# Patient Record
Sex: MALE | Race: WHITE | NOT HISPANIC OR LATINO | Employment: FULL TIME | URBAN - METROPOLITAN AREA
[De-identification: names, ages, dates, MRNs, and addresses within clinical notes are randomized per-mention and may not be internally consistent; named-entity substitution may affect disease eponyms.]

---

## 2022-09-23 ENCOUNTER — PREP FOR SURGERY (OUTPATIENT)
Dept: OTHER | Facility: HOSPITAL | Age: 52
End: 2022-09-23

## 2022-09-23 ENCOUNTER — OFFICE VISIT (OUTPATIENT)
Dept: SURGERY | Facility: CLINIC | Age: 52
End: 2022-09-23

## 2022-09-23 VITALS — HEIGHT: 72 IN | RESPIRATION RATE: 16 BRPM | BODY MASS INDEX: 40.66 KG/M2 | WEIGHT: 300.2 LBS

## 2022-09-23 DIAGNOSIS — L72.3 SEBACEOUS CYST: Primary | ICD-10-CM

## 2022-09-23 PROBLEM — E66.01 MORBID OBESITY: Status: ACTIVE | Noted: 2022-09-23

## 2022-09-23 PROCEDURE — 99203 OFFICE O/P NEW LOW 30 MIN: CPT | Performed by: SURGERY

## 2022-09-23 RX ORDER — SODIUM CHLORIDE, SODIUM LACTATE, POTASSIUM CHLORIDE, CALCIUM CHLORIDE 600; 310; 30; 20 MG/100ML; MG/100ML; MG/100ML; MG/100ML
70 INJECTION, SOLUTION INTRAVENOUS CONTINUOUS
Status: CANCELLED | OUTPATIENT
Start: 2022-09-23

## 2022-09-23 RX ORDER — ONDANSETRON 2 MG/ML
4 INJECTION INTRAMUSCULAR; INTRAVENOUS EVERY 6 HOURS PRN
Status: CANCELLED | OUTPATIENT
Start: 2022-09-23

## 2022-09-23 NOTE — PROGRESS NOTES
"Inpatient History and Physical Surgical Orders    Preadmission Location:   Preadmission Time:  Facility:  Surgery Date:  Surgery Time:  Preadmission Test date:     Chief Complaint  Outpatient History and Physical / Surgical Orders    Primary Care Provider: Provider, No Known    Referring Provider: Brenton Bernard PA    Subjective      Patient Name: Babak Manzano : 1970    HPI  The patient is a 52-year-old gentleman that presents with a sizable cyst on his right posterior shoulder as well as a small sebaceous cyst of the forehead.  The cyst on his right posterior shoulder is a little bit uncomfortable.    Past History:  Medical History: has no past medical history on file.   Surgical History: has a past surgical history that includes Hernia repair; Back surgery; Fracture surgery; and cervical spine posterior.   Family History: family history is not on file.   Social History: reports that he has been smoking cigarettes. He has been smoking about 0.50 packs per day. He has never used smokeless tobacco. He reports current alcohol use.  Allergies: Dilaudid [hydromorphone], Norco [hydrocodone-acetaminophen], Penicillins, Percocet [oxycodone-acetaminophen], and Tylenol [acetaminophen]     No current outpatient medications on file.       Objective   Vital Signs:   Resp 16   Ht 182.9 cm (72\")   Wt (!) 136 kg (300 lb 3.2 oz)   BMI 40.71 kg/m²       Physical Exam  Vitals and nursing note reviewed.   Constitutional:       Appearance: Normal appearance. The patient is well-developed.   Cardiovascular:      Rate and Rhythm: Normal rate and regular rhythm.   Pulmonary:      Effort: Pulmonary effort is normal.      Breath sounds: Normal air entry.   Abdominal:      General: Bowel sounds are normal.      Palpations: Abdomen is soft.      Skin:     General: Skin is warm and dry.   Neurological:      Mental Status: The patient is alert and oriented to person, place, and time.      Motor: Motor function is intact. "   Psychiatric:         Mood and Affect: Mood normal.   HEENT: He has a 1 cm sebaceous cyst in the middle of his forehead.  Back: 4 cm posterior right shoulder cyst noted  Result Review :               Assessment and Plan   Diagnoses and all orders for this visit:    1. Sebaceous cyst (Primary)    We will schedule him for an excision of the sebaceous cysts.  I have described the procedure to him as well as the risk and benefits and he is agreeable to proceeding.    I  Ad Cobos MD  09/23/2022

## 2022-10-10 ENCOUNTER — TELEPHONE (OUTPATIENT)
Dept: UROLOGY | Facility: CLINIC | Age: 52
End: 2022-10-10

## 2022-10-10 NOTE — TELEPHONE ENCOUNTER
Patient's wife called upset. She stated she needs to cancel her 's surgery.    She states he is currently in emergency surgery in [place] because he is septic from the sebaceous cyst that he was scheduled here  for removal.

## 2022-10-17 ENCOUNTER — OFFICE VISIT (OUTPATIENT)
Dept: FAMILY MEDICINE CLINIC | Facility: CLINIC | Age: 52
End: 2022-10-17

## 2022-10-17 VITALS
OXYGEN SATURATION: 98 % | WEIGHT: 292 LBS | TEMPERATURE: 96.6 F | DIASTOLIC BLOOD PRESSURE: 84 MMHG | SYSTOLIC BLOOD PRESSURE: 128 MMHG | BODY MASS INDEX: 39.6 KG/M2 | HEART RATE: 96 BPM

## 2022-10-17 DIAGNOSIS — Z48.01 ENCOUNTER FOR SURGICAL WOUND DRESSING CHANGE: Primary | ICD-10-CM

## 2022-10-17 DIAGNOSIS — D69.6 THROMBOCYTOPENIA: ICD-10-CM

## 2022-10-17 DIAGNOSIS — Z51.89 VISIT FOR WOUND CHECK: ICD-10-CM

## 2022-10-17 PROBLEM — L03.113 CELLULITIS OF RIGHT SHOULDER: Status: ACTIVE | Noted: 2022-10-10

## 2022-10-17 PROBLEM — Z98.890 STATUS POST INCISION AND DRAINAGE: Status: ACTIVE | Noted: 2022-10-11

## 2022-10-17 PROBLEM — A41.9 SEPSIS: Status: ACTIVE | Noted: 2022-10-10

## 2022-10-17 LAB
BASOPHILS # BLD MANUAL: 0.1 10*3/MM3 (ref 0–0.2)
BASOPHILS NFR BLD MANUAL: 1 % (ref 0–1.5)
DEPRECATED RDW RBC AUTO: 44.8 FL (ref 37–54)
EOSINOPHIL # BLD MANUAL: 0.1 10*3/MM3 (ref 0–0.4)
EOSINOPHIL NFR BLD MANUAL: 1 % (ref 0.3–6.2)
ERYTHROCYTE [DISTWIDTH] IN BLOOD BY AUTOMATED COUNT: 13.6 % (ref 12.3–15.4)
HCT VFR BLD AUTO: 44.7 % (ref 37.5–51)
HGB BLD-MCNC: 15.1 G/DL (ref 13–17.7)
LYMPHOCYTES # BLD MANUAL: 3.28 10*3/MM3 (ref 0.7–3.1)
LYMPHOCYTES NFR BLD MANUAL: 11 % (ref 5–12)
MCH RBC QN AUTO: 30.4 PG (ref 26.6–33)
MCHC RBC AUTO-ENTMCNC: 33.8 G/DL (ref 31.5–35.7)
MCV RBC AUTO: 89.9 FL (ref 79–97)
MONOCYTES # BLD: 1.06 10*3/MM3 (ref 0.1–0.9)
NEUTROPHILS # BLD AUTO: 5.11 10*3/MM3 (ref 1.7–7)
NEUTROPHILS NFR BLD MANUAL: 53 % (ref 42.7–76)
NRBC BLD AUTO-RTO: 0 /100 WBC (ref 0–0.2)
PLAT MORPH BLD: NORMAL
PLATELET # BLD AUTO: 226 10*3/MM3 (ref 140–450)
PMV BLD AUTO: 11.5 FL (ref 6–12)
RBC # BLD AUTO: 4.97 10*6/MM3 (ref 4.14–5.8)
RBC MORPH BLD: NORMAL
VARIANT LYMPHS NFR BLD MANUAL: 34 % (ref 19.6–45.3)
WBC MORPH BLD: NORMAL
WBC NRBC COR # BLD: 9.64 10*3/MM3 (ref 3.4–10.8)

## 2022-10-17 PROCEDURE — 85025 COMPLETE CBC W/AUTO DIFF WBC: CPT | Performed by: NURSE PRACTITIONER

## 2022-10-17 PROCEDURE — 85007 BL SMEAR W/DIFF WBC COUNT: CPT | Performed by: NURSE PRACTITIONER

## 2022-10-17 PROCEDURE — 99203 OFFICE O/P NEW LOW 30 MIN: CPT | Performed by: NURSE PRACTITIONER

## 2022-10-17 RX ORDER — IBUPROFEN 200 MG
200 TABLET ORAL EVERY 6 HOURS PRN
COMMUNITY

## 2022-10-17 RX ORDER — DOXYCYCLINE HYCLATE 100 MG/1
100 CAPSULE ORAL 2 TIMES DAILY
Qty: 56 CAPSULE | Refills: 0 | COMMUNITY
Start: 2022-10-13 | End: 2022-11-10

## 2022-10-17 NOTE — PROGRESS NOTES
Chief Complaint  Establish Care (Wound care follow up, lab draw for platelet count)      Subjective        History reviewed. No pertinent past medical history.  Social History     Tobacco Use   • Smoking status: Every Day     Packs/day: 0.50     Types: Cigarettes   • Smokeless tobacco: Never   Vaping Use   • Vaping Use: Some days   Substance Use Topics   • Alcohol use: Yes     Comment: social      Current Outpatient Medications on File Prior to Visit   Medication Sig   • doxycycline (VIBRAMYCIN) 100 MG capsule Take 1 capsule by mouth 2 (Two) Times a Day.   • ibuprofen (ADVIL,MOTRIN) 200 MG tablet Take 1 tablet by mouth Every 6 (Six) Hours As Needed.     No current facility-administered medications on file prior to visit.      Allergies   Allergen Reactions   • Dilaudid [Hydromorphone] Hives   • Norco [Hydrocodone-Acetaminophen] Hives   • Penicillins Hives   • Percocet [Oxycodone-Acetaminophen] Hives   • Tylenol [Acetaminophen] Hives      Health Maintenance Due   Topic Date Due   • COLORECTAL CANCER SCREENING  Never done   • COVID-19 Vaccine (1) Never done   • ANNUAL PHYSICAL  Never done   • Pneumococcal Vaccine 0-64 (1 - PCV) Never done   • ZOSTER VACCINE (1 of 2) Never done   • INFLUENZA VACCINE  Never done   • HEPATITIS C SCREENING  Never done      Babak Manzano presents to [facility]  History of Present Illness  Here to follow up on wound care, he had a sebaceous cyst and ruptured on the inside and ended up with sepsis and was admitted to hospital. States he had emergency surgery and had multiple organisms present. He was on vancomycin in the hospital and ancef.     Pts wife has been cleansing wound and repacking it once daily.     Pt moved to the area from [place]; plans to move out of [facility] the [facility] again soon.       Objective   Vital Signs:   /84   Pulse 96   Temp 96.6 °F (35.9 °C)   Wt 132 kg (292 lb)   SpO2 98%   BMI 39.60 kg/m²     Review of Systems   Physical  Exam  Vitals reviewed.   Constitutional:       General: He is not in acute distress.     Appearance: Normal appearance. He is well-developed.   HENT:      Head: Normocephalic and atraumatic.   Eyes:      Conjunctiva/sclera: Conjunctivae normal.      Pupils: Pupils are equal, round, and reactive to light.   Cardiovascular:      Rate and Rhythm: Normal rate and regular rhythm.      Heart sounds: Normal heart sounds.   Pulmonary:      Effort: Pulmonary effort is normal.      Breath sounds: Normal breath sounds.   Skin:     General: Skin is warm and dry.             Comments: Beefy red    Neurological:      Mental Status: He is alert and oriented to person, place, and time.   Psychiatric:         Mood and Affect: Mood and affect normal.         Behavior: Behavior normal.         Thought Content: Thought content normal.         Judgment: Judgment normal.        Result Review :   The following data was reviewed by: SANTHOSH Aj on 10/17/2022:    CBC from [facility].  Data reviewed: Recent hospitalization notes Admission note from [facility].           Assessment and Plan    Diagnoses and all orders for this visit:    1. Encounter for surgical wound dressing change (Primary)    2. Thrombocytopenia (HCC)  -     CBC & Differential    3. Visit for wound check           Dressing removed from inside wound, irrigated with partial peroxide and normal saline then rinse with plain normal saline.  Rolled gauze used to pack wound and 4 x 4 gauze placed over top and taped to skin.  Patient tolerated well.  Wound healing appropriately.  Patient instructed to follow-up in 1 week to recheck wound and continue current daily dressing changes at home.  Continue to take doxycycline.    Follow Up   Return in about 1 week (around 10/24/2022) for Recheck.  Patient was given instructions and counseling regarding his condition or for health maintenance advice. Please see specific information pulled into the AVS if  appropriate.

## 2022-10-17 NOTE — TELEPHONE ENCOUNTER
PER OSBALDO, PATIENT'S WIFE CAME INTO THE OFFICE TODAY, AND SAID SHE NEEDS THE SURGERY ON 10/20/22 CANCELLED.  SHE CAME IN AGGRAVATED THAT IT WAS STILL SCHEDULED.  SAID WE MANIPULATED SOMETHING AND HE HAD TO HAVE EMERGENCY SURGERY.

## 2022-10-19 ENCOUNTER — TELEPHONE (OUTPATIENT)
Dept: FAMILY MEDICINE CLINIC | Facility: CLINIC | Age: 52
End: 2022-10-19

## 2022-10-19 NOTE — TELEPHONE ENCOUNTER
Caller: EDI DOTSON    Relationship: Emergency Contact    Best call back number: [phone]    Who is your current provider: PETEY LONGO     Who would you like your new provider to be:  MARCI BETANCOURT     What are your reasons for transferring care: WOULD LIKE TO SEE A DIFFERENT PROVIDER

## 2022-10-21 ENCOUNTER — PATIENT ROUNDING (BHMG ONLY) (OUTPATIENT)
Dept: FAMILY MEDICINE CLINIC | Facility: CLINIC | Age: 52
End: 2022-10-21

## 2022-10-21 NOTE — PROGRESS NOTES
October 21, 2022    Hello, may I speak with Babak Manzano?    My name is Queta      I am  with [facility]  [address] [place] [address] [address] [place] [address] [phone].    Before we get started may I verify your date of birth? 1970    I am calling to officially welcome you to our practice and ask about your recent visit. Is this a good time to talk? yes    Tell me about your visit with us. What things went well?  went good       We're always looking for ways to make our patients' experiences even better. Do you have recommendations on ways we may improve?  no    Overall were you satisfied with your first visit to our practice? yes       I appreciate you taking the time to speak with me today. Is there anything else I can do for you? no      Thank you, and have a great day.

## 2022-10-24 ENCOUNTER — OFFICE VISIT (OUTPATIENT)
Dept: FAMILY MEDICINE CLINIC | Facility: CLINIC | Age: 52
End: 2022-10-24

## 2022-10-24 VITALS
BODY MASS INDEX: 38.47 KG/M2 | TEMPERATURE: 97.5 F | HEIGHT: 72 IN | SYSTOLIC BLOOD PRESSURE: 130 MMHG | DIASTOLIC BLOOD PRESSURE: 80 MMHG | HEART RATE: 93 BPM | WEIGHT: 284 LBS | OXYGEN SATURATION: 97 %

## 2022-10-24 DIAGNOSIS — Z48.01 ENCOUNTER FOR SURGICAL WOUND DRESSING CHANGE: Primary | ICD-10-CM

## 2022-10-24 DIAGNOSIS — Z51.89 VISIT FOR WOUND CHECK: ICD-10-CM

## 2022-10-24 PROCEDURE — 99212 OFFICE O/P EST SF 10 MIN: CPT | Performed by: NURSE PRACTITIONER

## 2022-10-24 RX ORDER — VIT C/B6/B5/MAGNESIUM/HERB 173 50-5-6-5MG
1 CAPSULE ORAL DAILY
COMMUNITY
End: 2022-11-02

## 2022-10-24 NOTE — PROGRESS NOTES
"Chief Complaint  Wound Check (Wound repacking)    Subjective        History reviewed. No pertinent past medical history.  Social History     Tobacco Use   • Smoking status: Every Day     Packs/day: 0.50     Types: Cigarettes   • Smokeless tobacco: Never   Vaping Use   • Vaping Use: Some days   Substance Use Topics   • Alcohol use: Yes     Comment: social      Current Outpatient Medications on File Prior to Visit   Medication Sig   • doxycycline (VIBRAMYCIN) 100 MG capsule Take 1 capsule by mouth 2 (Two) Times a Day.   • ibuprofen (ADVIL,MOTRIN) 200 MG tablet Take 1 tablet by mouth Every 6 (Six) Hours As Needed.   • Turmeric 500 MG capsule Take 1 capsule by mouth Daily.     No current facility-administered medications on file prior to visit.      Allergies   Allergen Reactions   • Azithromycin Hives   • Dilaudid [Hydromorphone] Hives   • Norco [Hydrocodone-Acetaminophen] Hives   • Penicillins Hives   • Percocet [Oxycodone-Acetaminophen] Hives   • Sulfa Antibiotics Hives and Swelling   • Tylenol [Acetaminophen] Hives      Health Maintenance Due   Topic Date Due   • COLORECTAL CANCER SCREENING  Never done   • COVID-19 Vaccine (1) Never done   • ANNUAL PHYSICAL  Never done   • Pneumococcal Vaccine 0-64 (1 - PCV) Never done   • ZOSTER VACCINE (1 of 2) Never done   • INFLUENZA VACCINE  Never done   • HEPATITIS C SCREENING  Never done      Babak Manzano presents to [facility]  History of Present Illness  Here to follow up on abscess to the right posterior shoulder. Pts wife is changing dressing daily.     Pt plans to follow up with Dr. Kessler to discuss any additional issues and have Colon cancer screening, etc, ordered.     Pt will remain off until area is healed due to inability to repack wound while on the road for weeks at a time as a .       Objective   Vital Signs:   /80 (BP Location: Left arm)   Pulse 93   Temp 97.5 °F (36.4 °C)   Ht 182.9 cm (72\")   Wt 129 kg " (284 lb)   SpO2 97%   BMI 38.52 kg/m²     Review of Systems   Physical Exam  Vitals reviewed.   Constitutional:       General: He is not in acute distress.     Appearance: Normal appearance. He is well-developed.   Cardiovascular:      Rate and Rhythm: Normal rate and regular rhythm.      Heart sounds: Normal heart sounds.   Pulmonary:      Effort: Pulmonary effort is normal.      Breath sounds: Normal breath sounds.   Skin:     General: Skin is warm and dry.             Comments: Old dressing removed with mildly yellowish drainage noted on gauze. Wound irrigated with Normal saline and then packed with kerlex moist gauze then covered with 4x4 dressing and paper tape used to secure.    Neurological:      Mental Status: He is alert and oriented to person, place, and time.   Psychiatric:         Mood and Affect: Mood and affect normal.         Behavior: Behavior normal.         Thought Content: Thought content normal.         Judgment: Judgment normal.        Result Review :                 Assessment and Plan    Diagnoses and all orders for this visit:    1. Encounter for surgical wound dressing change (Primary)    2. Visit for wound check        Follow Up   Return in about 1 week (around 10/31/2022) for Recheck.  Patient was given instructions and counseling regarding his condition or for health maintenance advice. Please see specific information pulled into the AVS if appropriate.

## 2022-10-31 ENCOUNTER — OFFICE VISIT (OUTPATIENT)
Dept: FAMILY MEDICINE CLINIC | Facility: CLINIC | Age: 52
End: 2022-10-31

## 2022-10-31 VITALS
TEMPERATURE: 96.9 F | HEART RATE: 103 BPM | SYSTOLIC BLOOD PRESSURE: 130 MMHG | DIASTOLIC BLOOD PRESSURE: 88 MMHG | BODY MASS INDEX: 38.19 KG/M2 | HEIGHT: 72 IN | OXYGEN SATURATION: 97 % | WEIGHT: 282 LBS

## 2022-10-31 DIAGNOSIS — Z48.01 ENCOUNTER FOR SURGICAL WOUND DRESSING CHANGE: Primary | ICD-10-CM

## 2022-10-31 DIAGNOSIS — Z51.89 VISIT FOR WOUND CHECK: ICD-10-CM

## 2022-10-31 PROCEDURE — 99212 OFFICE O/P EST SF 10 MIN: CPT | Performed by: NURSE PRACTITIONER

## 2022-10-31 NOTE — PROGRESS NOTES
"Chief Complaint  Wound Check (Repacking )    Subjective        History reviewed. No pertinent past medical history.  Social History     Tobacco Use   • Smoking status: Every Day     Packs/day: 0.50     Types: Cigarettes   • Smokeless tobacco: Never   Vaping Use   • Vaping Use: Some days   Substance Use Topics   • Alcohol use: Yes     Comment: social      Current Outpatient Medications on File Prior to Visit   Medication Sig   • doxycycline (VIBRAMYCIN) 100 MG capsule Take 1 capsule by mouth 2 (Two) Times a Day.   • ibuprofen (ADVIL,MOTRIN) 200 MG tablet Take 1 tablet by mouth Every 6 (Six) Hours As Needed.   • Turmeric 500 MG capsule Take 1 capsule by mouth Daily.     No current facility-administered medications on file prior to visit.      Allergies   Allergen Reactions   • Azithromycin Hives   • Dilaudid [Hydromorphone] Hives   • Norco [Hydrocodone-Acetaminophen] Hives   • Penicillins Hives   • Percocet [Oxycodone-Acetaminophen] Hives   • Sulfa Antibiotics Hives and Swelling   • Tylenol [Acetaminophen] Hives      Health Maintenance Due   Topic Date Due   • COLORECTAL CANCER SCREENING  Never done   • COVID-19 Vaccine (1) Never done   • ANNUAL PHYSICAL  Never done   • Pneumococcal Vaccine 0-64 (1 - PCV) Never done   • ZOSTER VACCINE (1 of 2) Never done   • INFLUENZA VACCINE  Never done   • HEPATITIS C SCREENING  Never done      Babak Maznano presents to [facility]  History of Present Illness  Here to follow-up on right upper back abscess.  Patient states that the area is itching, his wife is repacking daily.  Patient has 9 days of antibiotics left plans to complete full course.      Objective   Vital Signs:   /88 (BP Location: Left arm)   Pulse 103   Temp 96.9 °F (36.1 °C)   Ht 182.9 cm (72\")   Wt 128 kg (282 lb)   SpO2 97%   BMI 38.25 kg/m²     Review of Systems   Physical Exam  Vitals reviewed.   Constitutional:       General: He is not in acute distress.     " Appearance: Normal appearance. He is well-developed.   Skin:     General: Skin is warm and dry.          Neurological:      Mental Status: He is alert and oriented to person, place, and time.   Psychiatric:         Mood and Affect: Mood and affect normal.         Behavior: Behavior normal.         Thought Content: Thought content normal.         Judgment: Judgment normal.        Result Review :                 Assessment and Plan    Diagnoses and all orders for this visit:    1. Encounter for surgical wound dressing change (Primary)    2. Visit for wound check        Follow Up   Return if symptoms worsen or fail to improve.  Patient was given instructions and counseling regarding his condition or for health maintenance advice. Please see specific information pulled into the AVS if appropriate.

## 2022-11-02 ENCOUNTER — OFFICE VISIT (OUTPATIENT)
Dept: FAMILY MEDICINE CLINIC | Facility: CLINIC | Age: 52
End: 2022-11-02

## 2022-11-02 VITALS
HEIGHT: 72 IN | DIASTOLIC BLOOD PRESSURE: 72 MMHG | WEIGHT: 279.2 LBS | HEART RATE: 112 BPM | OXYGEN SATURATION: 98 % | TEMPERATURE: 96.8 F | BODY MASS INDEX: 37.82 KG/M2 | SYSTOLIC BLOOD PRESSURE: 146 MMHG

## 2022-11-02 DIAGNOSIS — Z23 NEED FOR INFLUENZA VACCINATION: ICD-10-CM

## 2022-11-02 DIAGNOSIS — F17.200 CURRENT SMOKER: ICD-10-CM

## 2022-11-02 DIAGNOSIS — B35.1 ONYCHOMYCOSIS: ICD-10-CM

## 2022-11-02 DIAGNOSIS — Z13.29 SCREENING FOR THYROID DISORDER: ICD-10-CM

## 2022-11-02 DIAGNOSIS — D23.30 DERMOID CYST OF FACE: ICD-10-CM

## 2022-11-02 DIAGNOSIS — Z76.89 ENCOUNTER TO ESTABLISH CARE: Primary | ICD-10-CM

## 2022-11-02 DIAGNOSIS — Z13.220 SCREENING FOR CHOLESTEROL LEVEL: ICD-10-CM

## 2022-11-02 DIAGNOSIS — E66.1 CLASS 2 DRUG-INDUCED OBESITY WITHOUT SERIOUS COMORBIDITY WITH BODY MASS INDEX (BMI) OF 37.0 TO 37.9 IN ADULT: ICD-10-CM

## 2022-11-02 DIAGNOSIS — Z11.59 ENCOUNTER FOR HEPATITIS C SCREENING TEST FOR LOW RISK PATIENT: ICD-10-CM

## 2022-11-02 DIAGNOSIS — Z12.11 SCREENING FOR COLON CANCER: ICD-10-CM

## 2022-11-02 DIAGNOSIS — Z13.1 SCREENING FOR DIABETES MELLITUS: ICD-10-CM

## 2022-11-02 DIAGNOSIS — I10 STAGE 2 HYPERTENSION: ICD-10-CM

## 2022-11-02 PROBLEM — E66.812 CLASS 2 OBESITY IN ADULT: Status: ACTIVE | Noted: 2022-09-23

## 2022-11-02 PROBLEM — M54.50 CHRONIC MIDLINE LOW BACK PAIN WITHOUT SCIATICA: Status: ACTIVE | Noted: 2022-11-02

## 2022-11-02 PROBLEM — E66.9 CLASS 2 OBESITY IN ADULT: Status: ACTIVE | Noted: 2022-09-23

## 2022-11-02 PROBLEM — Z86.19 HISTORY OF SEPSIS: Status: ACTIVE | Noted: 2022-11-02

## 2022-11-02 PROBLEM — G89.29 CHRONIC MIDLINE LOW BACK PAIN WITHOUT SCIATICA: Status: ACTIVE | Noted: 2022-11-02

## 2022-11-02 LAB
BASOPHILS # BLD AUTO: 0.03 10*3/MM3 (ref 0–0.2)
BASOPHILS NFR BLD AUTO: 0.5 % (ref 0–1.5)
DEPRECATED RDW RBC AUTO: 47.3 FL (ref 37–54)
EOSINOPHIL # BLD AUTO: 0.15 10*3/MM3 (ref 0–0.4)
EOSINOPHIL NFR BLD AUTO: 2.4 % (ref 0.3–6.2)
ERYTHROCYTE [DISTWIDTH] IN BLOOD BY AUTOMATED COUNT: 13.9 % (ref 12.3–15.4)
HBA1C MFR BLD: 6.2 % (ref 4.8–5.6)
HCT VFR BLD AUTO: 45.8 % (ref 37.5–51)
HGB BLD-MCNC: 15.1 G/DL (ref 13–17.7)
LYMPHOCYTES # BLD AUTO: 2.47 10*3/MM3 (ref 0.7–3.1)
LYMPHOCYTES NFR BLD AUTO: 39.7 % (ref 19.6–45.3)
MCH RBC QN AUTO: 30.5 PG (ref 26.6–33)
MCHC RBC AUTO-ENTMCNC: 33 G/DL (ref 31.5–35.7)
MCV RBC AUTO: 92.5 FL (ref 79–97)
MONOCYTES # BLD AUTO: 0.65 10*3/MM3 (ref 0.1–0.9)
MONOCYTES NFR BLD AUTO: 10.5 % (ref 5–12)
NEUTROPHILS NFR BLD AUTO: 2.91 10*3/MM3 (ref 1.7–7)
NEUTROPHILS NFR BLD AUTO: 46.7 % (ref 42.7–76)
PLATELET # BLD AUTO: 121 10*3/MM3 (ref 140–450)
PMV BLD AUTO: 14.3 FL (ref 6–12)
RBC # BLD AUTO: 4.95 10*6/MM3 (ref 4.14–5.8)
WBC NRBC COR # BLD: 6.22 10*3/MM3 (ref 3.4–10.8)

## 2022-11-02 PROCEDURE — 84443 ASSAY THYROID STIM HORMONE: CPT | Performed by: FAMILY MEDICINE

## 2022-11-02 PROCEDURE — 85025 COMPLETE CBC W/AUTO DIFF WBC: CPT | Performed by: FAMILY MEDICINE

## 2022-11-02 PROCEDURE — 86803 HEPATITIS C AB TEST: CPT | Performed by: FAMILY MEDICINE

## 2022-11-02 PROCEDURE — 80061 LIPID PANEL: CPT | Performed by: FAMILY MEDICINE

## 2022-11-02 PROCEDURE — 83036 HEMOGLOBIN GLYCOSYLATED A1C: CPT | Performed by: FAMILY MEDICINE

## 2022-11-02 PROCEDURE — 80053 COMPREHEN METABOLIC PANEL: CPT | Performed by: FAMILY MEDICINE

## 2022-11-02 PROCEDURE — 99213 OFFICE O/P EST LOW 20 MIN: CPT | Performed by: FAMILY MEDICINE

## 2022-11-02 NOTE — PROGRESS NOTES
"Chief Complaint    Establish Care (Exam to establish care)    Subjective      Babak Manzano presents to [facility]    History of Present Illness    1.) ESTABLISH CARE : Recent hospitalization secondary to sepsis - infected cyst.  Patient is now completing a course of doxycycline.  He presents with a wound of his right upper back - well healing - undergoing packing per his spouse.  History of chronic back pain - patient reports stable symptoms.  He presents today with complaint of intermittent chest discomfort - located proximal to his xiphoid process.  History of smoking - since age of 9 - pt admits that he has smoked up to 2 PPD. He currently smokes 1/2 PPD.     Objective     Vital Signs:     /72 (BP Location: Left arm, Patient Position: Sitting, Cuff Size: Adult)   Pulse 112   Temp 96.8 °F (36 °C) (Temporal)   Ht 182.9 cm (72\")   Wt 127 kg (279 lb 3.2 oz)   SpO2 98%   BMI 37.87 kg/m²       Physical Exam  Vitals reviewed.   Constitutional:       General: He is not in acute distress.     Appearance: Normal appearance. He is well-developed.   HENT:      Head: Normocephalic and atraumatic.      Right Ear: Hearing and external ear normal.      Left Ear: Hearing and external ear normal.      Nose: Nose normal.      Mouth/Throat:      Lips: No lesions.      Mouth: No oral lesions.      Dentition: No gum lesions.      Tongue: No lesions.      Palate: No lesions.      Pharynx: No oropharyngeal exudate or posterior oropharyngeal erythema.   Eyes:      General: Lids are normal.         Right eye: No discharge.         Left eye: No discharge.      Conjunctiva/sclera: Conjunctivae normal.   Cardiovascular:      Rate and Rhythm: Normal rate and regular rhythm.   Pulmonary:      Effort: Pulmonary effort is normal.      Breath sounds: Normal breath sounds.   Abdominal:      General: There is no distension.   Musculoskeletal:         General: No swelling.      Cervical back: Neck " supple.      Comments: (+) thickened + discolored nails noted of some nail beds of finger   Skin:     Coloration: Skin is not jaundiced.      Findings: No erythema.   Neurological:      Mental Status: He is alert. Mental status is at baseline.   Psychiatric:         Mood and Affect: Mood and affect normal.         Thought Content: Thought content normal.       Assessment and Plan     Diagnoses and all orders for this visit:    1. Encounter to establish care (Primary)  Comments:  See below.     2. Stage 2 hypertension  Comments:  Pt reports 'numbers at home on the brink of HTN' - will continue to monitor.   Orders:  -     CBC & Differential  -     Comprehensive Metabolic Panel    3. Screening for cholesterol level  -     Lipid Panel    4. Encounter for hepatitis C screening test for low risk patient  -     Hepatitis C Antibody    5. Screening for diabetes mellitus  -     Hemoglobin A1c    6. Class 2 drug-induced obesity without serious comorbidity with body mass index (BMI) of 37.0 to 37.9 in adult  -     Hemoglobin A1c    7. Screening for thyroid disorder  -     TSH    8. Need for influenza vaccination  Comments:  Declined per patient.    9. Screening for colon cancer  -     Cancel: Ambulatory Referral to Gastroenterology  -     Ambulatory Referral to General Surgery    10. Current smoker  -     CT Chest Low Dose Wo; Future    11. Onychomycosis  -     Ambulatory Referral to Podiatry    12. Dermoid cyst of face  -     Ambulatory Referral to Dermatology      Follow Up     Return for aforementioned issues TBD per review of labs.    Patient was given instructions and counseling regarding his condition or for health maintenance advice. Please see specific information pulled into the AVS if appropriate.

## 2022-11-02 NOTE — PROGRESS NOTES
Venipuncture Blood Specimen Collection  Venipuncture performed in LEFT AC By Brissa Heart MA with good hemostasis. Patient tolerated the procedure well without complications.   11/02/22   Brissa Heart MA

## 2022-11-03 LAB
ALBUMIN SERPL-MCNC: 4.6 G/DL (ref 3.5–5.2)
ALBUMIN/GLOB SERPL: 1.8 G/DL
ALP SERPL-CCNC: 61 U/L (ref 39–117)
ALT SERPL W P-5'-P-CCNC: 35 U/L (ref 1–41)
ANION GAP SERPL CALCULATED.3IONS-SCNC: 10.7 MMOL/L (ref 5–15)
AST SERPL-CCNC: 27 U/L (ref 1–40)
BILIRUB SERPL-MCNC: 0.5 MG/DL (ref 0–1.2)
BUN SERPL-MCNC: 19 MG/DL (ref 6–20)
BUN/CREAT SERPL: 20.4 (ref 7–25)
CALCIUM SPEC-SCNC: 10.1 MG/DL (ref 8.6–10.5)
CHLORIDE SERPL-SCNC: 105 MMOL/L (ref 98–107)
CHOLEST SERPL-MCNC: 179 MG/DL (ref 0–200)
CO2 SERPL-SCNC: 23.3 MMOL/L (ref 22–29)
CREAT SERPL-MCNC: 0.93 MG/DL (ref 0.76–1.27)
EGFRCR SERPLBLD CKD-EPI 2021: 99.4 ML/MIN/1.73
GLOBULIN UR ELPH-MCNC: 2.5 GM/DL
GLUCOSE SERPL-MCNC: 103 MG/DL (ref 65–99)
HCV AB SER DONR QL: NORMAL
HDLC SERPL-MCNC: 49 MG/DL (ref 40–60)
LDLC SERPL CALC-MCNC: 104 MG/DL (ref 0–100)
LDLC/HDLC SERPL: 2.04 {RATIO}
POTASSIUM SERPL-SCNC: 4 MMOL/L (ref 3.5–5.2)
PROT SERPL-MCNC: 7.1 G/DL (ref 6–8.5)
SODIUM SERPL-SCNC: 139 MMOL/L (ref 136–145)
TRIGL SERPL-MCNC: 149 MG/DL (ref 0–150)
TSH SERPL DL<=0.05 MIU/L-ACNC: 3.75 UIU/ML (ref 0.27–4.2)
VLDLC SERPL-MCNC: 26 MG/DL (ref 5–40)

## 2022-11-08 ENCOUNTER — HOSPITAL ENCOUNTER (OUTPATIENT)
Dept: CT IMAGING | Facility: HOSPITAL | Age: 52
Discharge: HOME | End: 2022-11-08
Admitting: FAMILY MEDICINE

## 2022-11-08 DIAGNOSIS — F17.200 CURRENT SMOKER: ICD-10-CM

## 2022-11-08 PROCEDURE — 71271 CT THORAX LUNG CANCER SCR C-: CPT

## 2022-11-15 ENCOUNTER — OFFICE VISIT (OUTPATIENT)
Dept: SURGERY | Facility: CLINIC | Age: 52
End: 2022-11-15

## 2022-11-15 ENCOUNTER — PREP FOR SURGERY (OUTPATIENT)
Dept: OTHER | Facility: HOSPITAL | Age: 52
End: 2022-11-15

## 2022-11-15 VITALS — BODY MASS INDEX: 39.6 KG/M2 | WEIGHT: 292 LBS

## 2022-11-15 DIAGNOSIS — Z12.12 SCREENING FOR COLORECTAL CANCER: Primary | ICD-10-CM

## 2022-11-15 DIAGNOSIS — Z12.11 SCREENING FOR COLORECTAL CANCER: Primary | ICD-10-CM

## 2022-11-15 PROCEDURE — S0260 H&P FOR SURGERY: HCPCS | Performed by: SURGERY

## 2022-11-15 RX ORDER — SODIUM, POTASSIUM,MAG SULFATES 17.5-3.13G
SOLUTION, RECONSTITUTED, ORAL ORAL
Qty: 177 ML | Refills: 0 | Status: ON HOLD | OUTPATIENT
Start: 2022-11-15 | End: 2023-02-23

## 2022-11-16 ENCOUNTER — OFFICE VISIT (OUTPATIENT)
Dept: FAMILY MEDICINE CLINIC | Facility: CLINIC | Age: 52
End: 2022-11-16

## 2022-11-16 VITALS
TEMPERATURE: 97.5 F | BODY MASS INDEX: 39.66 KG/M2 | WEIGHT: 292.8 LBS | HEIGHT: 72 IN | DIASTOLIC BLOOD PRESSURE: 88 MMHG | OXYGEN SATURATION: 98 % | SYSTOLIC BLOOD PRESSURE: 176 MMHG | HEART RATE: 81 BPM

## 2022-11-16 DIAGNOSIS — E78.5 HYPERLIPIDEMIA, UNSPECIFIED HYPERLIPIDEMIA TYPE: ICD-10-CM

## 2022-11-16 DIAGNOSIS — S21.201S: ICD-10-CM

## 2022-11-16 DIAGNOSIS — R73.03 PREDIABETES: Primary | ICD-10-CM

## 2022-11-16 PROCEDURE — 99213 OFFICE O/P EST LOW 20 MIN: CPT | Performed by: FAMILY MEDICINE

## 2022-11-16 NOTE — PROGRESS NOTES
"Chief Complaint    Results (Discuss lab results)    Subjective      Babak Manzano presents to [facility]    History of Present Illness    1.) HLD : Most recent cholesterol panel significant for slight elevation of LDL, but ASCVD risk of 9%.  Patient presents today reporting that he is opting for trial of diet and exercise during the next 3 months for    2.) PREDIABETES : Most recent A1c measured at 6.2%.  Patient admits to family history of diabetes.  He is also opting for trial of diet and exercise during the next 3 months.    3.)  WOUND OF RIGHT UPPER BACK : Healing well.  Patient is not complaining of any pain or drainage.  Wound care at home per his spouse.    Objective     Vital Signs:     /88 (BP Location: Left arm, Patient Position: Sitting, Cuff Size: Adult)   Pulse 81   Temp 97.5 °F (36.4 °C) (Temporal)   Ht 182.9 cm (72\")   Wt 133 kg (292 lb 12.8 oz)   SpO2 98%   BMI 39.71 kg/m²       Physical Exam  Vitals reviewed.   Constitutional:       General: He is not in acute distress.     Appearance: Normal appearance. He is well-developed.   HENT:      Head: Normocephalic and atraumatic.      Right Ear: Hearing and external ear normal.      Left Ear: Hearing and external ear normal.      Nose: Nose normal.   Eyes:      General: Lids are normal.         Right eye: No discharge.         Left eye: No discharge.      Conjunctiva/sclera: Conjunctivae normal.   Pulmonary:      Effort: Pulmonary effort is normal.   Abdominal:      General: There is no distension.   Musculoskeletal:         General: No swelling.        Arms:       Cervical back: Neck supple.      Comments: Well-healing wound noted.  Measuring approximately 2.5 cm in length.  No significant drainage appreciated.  No significant proximal erythema appreciated.   Skin:     Coloration: Skin is not jaundiced.      Findings: No erythema.   Neurological:      Mental Status: He is alert. Mental status is at baseline. "   Psychiatric:         Mood and Affect: Mood and affect normal.         Thought Content: Thought content normal.     Assessment and Plan     Diagnoses and all orders for this visit:    1. Prediabetes (Primary)  Comments:  Trial of diet and exercise x3 months.    2. Hyperlipidemia, unspecified hyperlipidemia type  Comments:  Same as above.  Expected to repeat cholesterol panel in 3 months.    3. Wound of right side of back, sequela  Comments:  Patient will continue with wound care at home.  Will return in approximately 1.5 weeks regarding ability to return to work.    Patient was given instructions and counseling regarding his condition or for health maintenance advice. Please see specific information pulled into the AVS if appropriate.

## 2022-11-18 NOTE — PROGRESS NOTES
General Surgery/Colorectal Surgery Note    Patient Name:  Babak Manzano  YOB: 1970  [id number]    Referring Provider: Garry Kessler DO      Patient Care Team:  Garry Kessler DO as PCP - General (Family Medicine)    Chief complaint screening    Subjective .     History of present illness:    Comes in to arrange screening colonoscopy.  No family history of colorectal cancer.  No previous colonoscopy.  No change in bowel habits.  No abdominal pain.  No weight loss.  No blood in the stool.  No blood thinner use.  Some recent chest pain with work-up in progress.  Positive tobacco abuse.      History:  Past Medical History:   Diagnosis Date   • Hyperlipidemia        Past Surgical History:   Procedure Laterality Date   • BACK SURGERY     • CERVICAL SPINE POSTERIOR     • CYST REMOVAL Right     upper back   • FRACTURE SURGERY     • HERNIA REPAIR         History reviewed. No pertinent family history.    Social History     Tobacco Use   • Smoking status: Every Day     Packs/day: 0.50     Types: Cigarettes   • Smokeless tobacco: Never   Vaping Use   • Vaping Use: Some days   Substance Use Topics   • Alcohol use: Yes     Comment: social   • Drug use: Never       Review of Systems  All systems were reviewed and negative except for:   Review of Systems   Constitutional: Negative for chills, fever and unexpected weight loss.   HENT: Negative for congestion, nosebleeds and voice change.    Eyes: Negative for blurred vision, double vision and discharge.   Respiratory: Negative for apnea, chest tightness and shortness of breath.    Cardiovascular: Negative for chest pain and leg swelling.   Gastrointestinal:        See HPI   Endocrine: Negative for cold intolerance and heat intolerance.   Genitourinary: Negative for dysuria, hematuria and urgency.   Musculoskeletal: Negative for back pain, joint swelling and neck pain.   Skin: Negative for color change and dry skin.   Neurological: Negative for dizziness and  confusion.   Hematological: Negative for adenopathy.   Psychiatric/Behavioral: Negative for agitation and behavioral problems.     MEDS:  Prior to Admission medications    Medication Sig Start Date End Date Taking? Authorizing Provider   ibuprofen (ADVIL,MOTRIN) 200 MG tablet Take 1 tablet by mouth Every 6 (Six) Hours As Needed.   Yes Provider, MD Mateo   sodium-potassium-magnesium sulfates (SUPREP) 17.5-3.13-1.6 GM/177ML solution oral solution Take as directed 11/15/22   Maurisio Mendoza MD        Allergies:  Azithromycin, Dilaudid [hydromorphone], Norco [hydrocodone-acetaminophen], Penicillins, Percocet [oxycodone-acetaminophen], Sulfa antibiotics, and Tylenol [acetaminophen]    Objective     Vital Signs        Physical Exam:     General Appearance:    Alert, cooperative, in no acute distress   Head:    Normocephalic, without obvious abnormality, atraumatic   Eyes:          Conjunctivae and sclerae normal, no icterus,     Ears:    Ears appear intact with no abnormalities noted   Throat:   No oral lesions, no thrush, oral mucosa moist   Neck:   No adenopathy, supple, trachea midline, no thyromegaly   Back:     No kyphosis present, no scoliosis present, no skin lesions,      erythema or scars, no tenderness to percussion or                   palpation,   range of motion normal   Lungs:     Clear to auscultation,respirations regular, even and                  unlabored    Heart:    Regular rhythm and normal rate, normal S1 and S2, no            murmur, no gallop, no rub, no click   Chest Wall:    No abnormalities observed   Abdomen:     Normal bowel sounds, no masses, no organomegaly, soft        non-tender, non-distended, no guarding, no rebound                tenderness   Rectal:        Extremities:   Moves all extremities well, no edema, no cyanosis, no             redness   Pulses:   Pulses palpable and equal bilaterally   Skin:   No bleeding, bruising or rash   Lymph nodes:   No palpable adenopathy  "  Neurologic:   A/o x 4 with no deficits       Results Review:   {Results Review:[id number]::\"I reviewed the patient's new clinical results.\"    LABS/IMAGING:  Results for orders placed or performed in visit on 11/02/22   Lipid Panel    Specimen: Blood   Result Value Ref Range    Total Cholesterol 179 0 - 200 mg/dL    Triglycerides 149 0 - 150 mg/dL    HDL Cholesterol 49 40 - 60 mg/dL    LDL Cholesterol  104 (H) 0 - 100 mg/dL    VLDL Cholesterol 26 5 - 40 mg/dL    LDL/HDL Ratio 2.04    TSH    Specimen: Blood   Result Value Ref Range    TSH 3.750 0.270 - 4.200 uIU/mL   Hepatitis C Antibody    Specimen: Blood   Result Value Ref Range    Hepatitis C Ab Non-Reactive Non-Reactive   Hemoglobin A1c    Specimen: Blood   Result Value Ref Range    Hemoglobin A1C 6.20 (H) 4.80 - 5.60 %   Comprehensive Metabolic Panel    Specimen: Blood   Result Value Ref Range    Glucose 103 (H) 65 - 99 mg/dL    BUN 19 6 - 20 mg/dL    Creatinine 0.93 0.76 - 1.27 mg/dL    Sodium 139 136 - 145 mmol/L    Potassium 4.0 3.5 - 5.2 mmol/L    Chloride 105 98 - 107 mmol/L    CO2 23.3 22.0 - 29.0 mmol/L    Calcium 10.1 8.6 - 10.5 mg/dL    Total Protein 7.1 6.0 - 8.5 g/dL    Albumin 4.60 3.50 - 5.20 g/dL    ALT (SGPT) 35 1 - 41 U/L    AST (SGOT) 27 1 - 40 U/L    Alkaline Phosphatase 61 39 - 117 U/L    Total Bilirubin 0.5 0.0 - 1.2 mg/dL    Globulin 2.5 gm/dL    A/G Ratio 1.8 g/dL    BUN/Creatinine Ratio 20.4 7.0 - 25.0    Anion Gap 10.7 5.0 - 15.0 mmol/L    eGFR 99.4 >60.0 mL/min/1.73   CBC Auto Differential    Specimen: Blood   Result Value Ref Range    WBC 6.22 3.40 - 10.80 10*3/mm3    RBC 4.95 4.14 - 5.80 10*6/mm3    Hemoglobin 15.1 13.0 - 17.7 g/dL    Hematocrit 45.8 37.5 - 51.0 %    MCV 92.5 79.0 - 97.0 fL    MCH 30.5 26.6 - 33.0 pg    MCHC 33.0 31.5 - 35.7 g/dL    RDW 13.9 12.3 - 15.4 %    RDW-SD 47.3 37.0 - 54.0 fl    MPV 14.3 (H) 6.0 - 12.0 fL    Platelets 121 (L) 140 - 450 10*3/mm3    Neutrophil % 46.7 42.7 - 76.0 %    Lymphocyte % 39.7 19.6 - 45.3 % "    Monocyte % 10.5 5.0 - 12.0 %    Eosinophil % 2.4 0.3 - 6.2 %    Basophil % 0.5 0.0 - 1.5 %    Neutrophils, Absolute 2.91 1.70 - 7.00 10*3/mm3    Lymphocytes, Absolute 2.47 0.70 - 3.10 10*3/mm3    Monocytes, Absolute 0.65 0.10 - 0.90 10*3/mm3    Eosinophils, Absolute 0.15 0.00 - 0.40 10*3/mm3    Basophils, Absolute 0.03 0.00 - 0.20 10*3/mm3        Result Review :     Assessment & Plan     Screening for colorectal cancer    I recommended colonoscopy.  Benefits and alternatives discussed.  Risk procedure including bleeding, perforation, missing a polyp discussed.  All questions answered.  He agrees with the plan.  Orders placed.  Thank for the consult              This document has been electronically signed by Maurisio Mendoza MD  November 18, 2022 14:27 EST

## 2022-11-28 ENCOUNTER — OFFICE VISIT (OUTPATIENT)
Dept: FAMILY MEDICINE CLINIC | Facility: CLINIC | Age: 52
End: 2022-11-28

## 2022-11-28 VITALS
TEMPERATURE: 96.9 F | BODY MASS INDEX: 39.55 KG/M2 | SYSTOLIC BLOOD PRESSURE: 124 MMHG | DIASTOLIC BLOOD PRESSURE: 82 MMHG | HEART RATE: 102 BPM | WEIGHT: 292 LBS | HEIGHT: 72 IN | OXYGEN SATURATION: 96 %

## 2022-11-28 DIAGNOSIS — S21.201S: Primary | ICD-10-CM

## 2022-11-28 PROCEDURE — 99213 OFFICE O/P EST LOW 20 MIN: CPT | Performed by: FAMILY MEDICINE

## 2022-11-28 NOTE — PROGRESS NOTES
"Chief Complaint    Wound Check (Follow up to wound, return to work)    Subjective      Babak Manzano presents to [facility]    History of Present Illness    1.) F/U WOUND OF RIGHT UPPER BACK : Pt presents for an evaluation regarding his ability to return to work. Wound care per spouse. No c/o regarding area today.     Objective     Vital Signs:     /82 (BP Location: Left arm)   Pulse 102   Temp 96.9 °F (36.1 °C)   Ht 182.9 cm (72\")   Wt 132 kg (292 lb)   SpO2 96%   BMI 39.60 kg/m²       Physical Exam  Vitals reviewed.   Constitutional:       General: He is not in acute distress.     Appearance: Normal appearance. He is well-developed.   HENT:      Head: Normocephalic and atraumatic.      Right Ear: Hearing and external ear normal.      Left Ear: Hearing and external ear normal.      Nose: Nose normal.   Eyes:      General: Lids are normal.         Right eye: No discharge.         Left eye: No discharge.      Conjunctiva/sclera: Conjunctivae normal.   Pulmonary:      Effort: Pulmonary effort is normal.   Abdominal:      General: There is no distension.   Musculoskeletal:         General: No swelling.      Cervical back: Neck supple.   Skin:            Comments: Well-healing wound noted. Area is not well-approximately, but no drainage, no erythema, no tenderness with palpation.   Neurological:      Mental Status: He is alert. Mental status is at baseline.   Psychiatric:         Mood and Affect: Mood and affect normal.         Thought Content: Thought content normal.     Assessment and Plan     Diagnoses and all orders for this visit:    1. Wound of right side of back, sequela (Primary)  Comments:  Healing well. No need for additional dressings. Patient provided with note to return to work on 12.1.22.      Follow Up : As needed.     Patient was given instructions and counseling regarding his condition or for health maintenance advice. Please see specific information pulled " into the AVS if appropriate.

## 2022-12-12 PROBLEM — Z12.12 SCREENING FOR COLORECTAL CANCER: Status: ACTIVE | Noted: 2022-11-15

## 2022-12-12 PROBLEM — Z12.11 SCREENING FOR COLORECTAL CANCER: Status: ACTIVE | Noted: 2022-11-15

## 2023-02-21 ENCOUNTER — TELEPHONE (OUTPATIENT)
Dept: SURGERY | Facility: CLINIC | Age: 53
End: 2023-02-21
Payer: COMMERCIAL

## 2023-02-21 NOTE — TELEPHONE ENCOUNTER
Called and spoke with patient and he said he had already spoken to someone at the hospital in regards to colonoscopy questions, all questions answered.

## 2023-02-23 ENCOUNTER — ANESTHESIA (OUTPATIENT)
Dept: GASTROENTEROLOGY | Facility: HOSPITAL | Age: 53
End: 2023-02-23
Payer: COMMERCIAL

## 2023-02-23 ENCOUNTER — ANESTHESIA EVENT (OUTPATIENT)
Dept: GASTROENTEROLOGY | Facility: HOSPITAL | Age: 53
End: 2023-02-23
Payer: COMMERCIAL

## 2023-02-23 ENCOUNTER — HOSPITAL ENCOUNTER (OUTPATIENT)
Facility: HOSPITAL | Age: 53
Setting detail: HOSPITAL OUTPATIENT SURGERY
Discharge: HOME | End: 2023-02-23
Attending: SURGERY | Admitting: SURGERY
Payer: COMMERCIAL

## 2023-02-23 VITALS
OXYGEN SATURATION: 94 % | HEIGHT: 72 IN | RESPIRATION RATE: 15 BRPM | TEMPERATURE: 96.4 F | WEIGHT: 302.47 LBS | HEART RATE: 75 BPM | DIASTOLIC BLOOD PRESSURE: 82 MMHG | BODY MASS INDEX: 40.97 KG/M2 | SYSTOLIC BLOOD PRESSURE: 122 MMHG

## 2023-02-23 PROCEDURE — 25010000002 PROPOFOL 10 MG/ML EMULSION: Performed by: NURSE ANESTHETIST, CERTIFIED REGISTERED

## 2023-02-23 RX ORDER — LIDOCAINE HYDROCHLORIDE 20 MG/ML
INJECTION, SOLUTION EPIDURAL; INFILTRATION; INTRACAUDAL; PERINEURAL AS NEEDED
Status: DISCONTINUED | OUTPATIENT
Start: 2023-02-23 | End: 2023-02-23 | Stop reason: SURG

## 2023-02-23 RX ORDER — GLYCOPYRROLATE 0.2 MG/ML
INJECTION INTRAMUSCULAR; INTRAVENOUS AS NEEDED
Status: DISCONTINUED | OUTPATIENT
Start: 2023-02-23 | End: 2023-02-23 | Stop reason: SURG

## 2023-02-23 RX ORDER — SODIUM CHLORIDE, SODIUM LACTATE, POTASSIUM CHLORIDE, CALCIUM CHLORIDE 600; 310; 30; 20 MG/100ML; MG/100ML; MG/100ML; MG/100ML
30 INJECTION, SOLUTION INTRAVENOUS CONTINUOUS
Status: DISCONTINUED | OUTPATIENT
Start: 2023-02-23 | End: 2023-02-23 | Stop reason: HOSPADM

## 2023-02-23 RX ORDER — PROPOFOL 10 MG/ML
VIAL (ML) INTRAVENOUS AS NEEDED
Status: DISCONTINUED | OUTPATIENT
Start: 2023-02-23 | End: 2023-02-23 | Stop reason: SURG

## 2023-02-23 RX ADMIN — PROPOFOL 200 MCG/KG/MIN: 10 INJECTION, EMULSION INTRAVENOUS at 08:17

## 2023-02-23 RX ADMIN — LIDOCAINE HYDROCHLORIDE 50 MG: 20 INJECTION, SOLUTION EPIDURAL; INFILTRATION; INTRACAUDAL; PERINEURAL at 08:17

## 2023-02-23 RX ADMIN — SODIUM CHLORIDE, POTASSIUM CHLORIDE, SODIUM LACTATE AND CALCIUM CHLORIDE 30 ML/HR: 600; 310; 30; 20 INJECTION, SOLUTION INTRAVENOUS at 07:54

## 2023-02-23 RX ADMIN — PROPOFOL 100 MG: 10 INJECTION, EMULSION INTRAVENOUS at 08:17

## 2023-02-23 RX ADMIN — GLYCOPYRROLATE 0.2 MG: 0.2 INJECTION INTRAMUSCULAR; INTRAVENOUS at 08:22

## 2023-02-23 NOTE — ANESTHESIA POSTPROCEDURE EVALUATION
Patient: Babak Manzano    Procedure Summary     Date: 02/23/23 Room / Location: [facility] ENDOSCOPY 1 / [facility] ENDOSCOPY    Anesthesia Start: 0814 Anesthesia Stop: 0834    Procedure: COLONOSCOPY Diagnosis:       Screening for colorectal cancer      (Screening for colorectal cancer [Z12.11, Z12.12])    Surgeons: Maurisio Mendoza MD Provider: Reyes, Mirabelle, DO    Anesthesia Type: general ASA Status: 2          Anesthesia Type: general    Vitals  Vitals Value Taken Time   /82 02/23/23 0850   Temp 35.8 °C (96.4 °F) 02/23/23 0835   Pulse 75 02/23/23 0850   Resp 15 02/23/23 0840   SpO2 94 % 02/23/23 0850           Post Anesthesia Care and Evaluation    Patient location during evaluation: bedside  Patient participation: complete - patient participated  Level of consciousness: awake  Pain management: adequate    Airway patency: patent  Anesthetic complications: No anesthetic complications  PONV Status: none  Cardiovascular status: acceptable and stable  Respiratory status: acceptable  Hydration status: acceptable    Comments: An Anesthesiologist personally participated in the most demanding procedures (including induction and emergence if applicable) in the anesthesia plan, monitored the course of anesthesia administration at frequent intervals and remained physically present and available for immediate diagnosis and treatment of emergencies.

## 2023-02-23 NOTE — ANESTHESIA PREPROCEDURE EVALUATION
Anesthesia Evaluation     Patient summary reviewed and Nursing notes reviewed   no history of anesthetic complications:  NPO Solid Status: > 8 hours  NPO Liquid Status: > 2 hours           Airway   Mallampati: II  TM distance: >3 FB  Neck ROM: full  No difficulty expected  Dental - normal exam     Pulmonary - normal exam    breath sounds clear to auscultation  (+) a smoker (vapes--used this morning) Former,   Cardiovascular - normal exam  Exercise tolerance: good (4-7 METS)    Rhythm: regular  Rate: normal    (+) hyperlipidemia,       Neuro/Psych- negative ROS  GI/Hepatic/Renal/Endo    (+) morbid obesity,      Musculoskeletal (-) negative ROS    Abdominal    Substance History - negative use     OB/GYN negative ob/gyn ROS         Other - negative ROS       ROS/Med Hx Other: PAT Nursing Notes unavailable.                   Anesthesia Plan    ASA 2     general     (Patient understands anesthesia not responsible for dental damage.)  intravenous induction     Anesthetic plan, risks, benefits, and alternatives have been provided, discussed and informed consent has been obtained with: patient.  Pre-procedure education provided  Use of blood products discussed with patient  Consented to blood products.   Plan discussed with CRNA.        CODE STATUS:

## 2023-02-24 ENCOUNTER — OFFICE VISIT (OUTPATIENT)
Dept: FAMILY MEDICINE CLINIC | Facility: CLINIC | Age: 53
End: 2023-02-24
Payer: COMMERCIAL

## 2023-02-24 VITALS
WEIGHT: 304 LBS | BODY MASS INDEX: 41.17 KG/M2 | TEMPERATURE: 96.6 F | HEIGHT: 72 IN | OXYGEN SATURATION: 98 % | HEART RATE: 104 BPM

## 2023-02-24 DIAGNOSIS — D23.39 DERMOID CYST OF FOREHEAD: Primary | ICD-10-CM

## 2023-02-24 PROCEDURE — 99213 OFFICE O/P EST LOW 20 MIN: CPT | Performed by: FAMILY MEDICINE

## 2023-02-24 NOTE — PROGRESS NOTES
"Chief Complaint    Cyst (Cyst in center of forehead for quite some time, has been getting larger.)    Subjective      Babak Manzano presents to [facility]    History of Present Illness    1.) CYST OF FOREHEAD : Patient presents with a cyst of his forehead.  Mid-forehead.  Intermittent tenderness and erythema of skin.  Patient also will soon be 'going out on the road' as a  and is concerned for inflammation, while on the road.     Objective     Vital Signs:     Pulse 104   Temp 96.6 °F (35.9 °C) (Temporal)   Ht 182.9 cm (72\")   Wt (!) 138 kg (304 lb)   SpO2 98%   BMI 41.23 kg/m²       Physical Exam  Vitals reviewed.   Constitutional:       General: He is not in acute distress.     Appearance: Normal appearance. He is well-developed.   HENT:      Head: Normocephalic and atraumatic.        Comments: Somewhat larger than a pea-sized mass appreciated of area noted above. No tenderness w/ palpation. No erythema of skin.     Right Ear: Hearing and external ear normal.      Left Ear: Hearing and external ear normal.      Nose: Nose normal.   Eyes:      General: Lids are normal.         Right eye: No discharge.         Left eye: No discharge.      Conjunctiva/sclera: Conjunctivae normal.   Pulmonary:      Effort: Pulmonary effort is normal.   Abdominal:      General: There is no distension.   Musculoskeletal:         General: No swelling.      Cervical back: Neck supple.   Skin:     Coloration: Skin is not jaundiced.      Findings: No erythema.   Neurological:      Mental Status: He is alert. Mental status is at baseline.   Psychiatric:         Mood and Affect: Mood and affect normal.         Thought Content: Thought content normal.     Assessment and Plan     Diagnoses and all orders for this visit:    1. Dermoid cyst of forehead (Primary)  Comments:  Referred to derm for removal.  Orders:  -     Ambulatory Referral to Dermatology    Patient was given instructions and " counseling regarding his condition or for health maintenance advice. Please see specific information pulled into the AVS if appropriate.

## 2023-10-20 ENCOUNTER — OFFICE VISIT (OUTPATIENT)
Dept: FAMILY MEDICINE CLINIC | Facility: CLINIC | Age: 53
End: 2023-10-20
Payer: COMMERCIAL

## 2023-10-20 VITALS
DIASTOLIC BLOOD PRESSURE: 72 MMHG | OXYGEN SATURATION: 98 % | HEIGHT: 72 IN | TEMPERATURE: 97.3 F | HEART RATE: 102 BPM | BODY MASS INDEX: 42.66 KG/M2 | WEIGHT: 315 LBS | SYSTOLIC BLOOD PRESSURE: 134 MMHG

## 2023-10-20 DIAGNOSIS — E78.5 HYPERLIPIDEMIA, UNSPECIFIED HYPERLIPIDEMIA TYPE: Primary | ICD-10-CM

## 2023-10-20 DIAGNOSIS — Z12.5 SCREENING FOR PROSTATE CANCER: ICD-10-CM

## 2023-10-20 DIAGNOSIS — M25.551 RIGHT HIP PAIN: ICD-10-CM

## 2023-10-20 DIAGNOSIS — R29.898 WEAKNESS OF RIGHT LEG: ICD-10-CM

## 2023-10-20 DIAGNOSIS — R73.03 PREDIABETES: ICD-10-CM

## 2023-10-20 DIAGNOSIS — Z23 NEED FOR INFLUENZA VACCINATION: ICD-10-CM

## 2023-10-20 DIAGNOSIS — E66.01 CLASS 3 SEVERE OBESITY DUE TO EXCESS CALORIES WITHOUT SERIOUS COMORBIDITY WITH BODY MASS INDEX (BMI) OF 45.0 TO 49.9 IN ADULT: ICD-10-CM

## 2023-10-20 DIAGNOSIS — Z86.2 HISTORY OF THROMBOCYTOPENIA: ICD-10-CM

## 2023-10-20 LAB
ALBUMIN SERPL-MCNC: 4.2 G/DL (ref 3.5–5.2)
ALBUMIN/GLOB SERPL: 1.6 G/DL
ALP SERPL-CCNC: 57 U/L (ref 39–117)
ALT SERPL W P-5'-P-CCNC: 73 U/L (ref 1–41)
ANION GAP SERPL CALCULATED.3IONS-SCNC: 10 MMOL/L (ref 5–15)
AST SERPL-CCNC: 36 U/L (ref 1–40)
BASOPHILS # BLD AUTO: 0.03 10*3/MM3 (ref 0–0.2)
BASOPHILS NFR BLD AUTO: 0.4 % (ref 0–1.5)
BILIRUB SERPL-MCNC: 0.3 MG/DL (ref 0–1.2)
BUN SERPL-MCNC: 25 MG/DL (ref 6–20)
BUN/CREAT SERPL: 25.8 (ref 7–25)
CALCIUM SPEC-SCNC: 9.3 MG/DL (ref 8.6–10.5)
CHLORIDE SERPL-SCNC: 105 MMOL/L (ref 98–107)
CHOLEST SERPL-MCNC: 145 MG/DL (ref 0–200)
CO2 SERPL-SCNC: 24 MMOL/L (ref 22–29)
CREAT SERPL-MCNC: 0.97 MG/DL (ref 0.76–1.27)
DEPRECATED RDW RBC AUTO: 45.2 FL (ref 37–54)
EGFRCR SERPLBLD CKD-EPI 2021: 93.9 ML/MIN/1.73
EOSINOPHIL # BLD AUTO: 0.19 10*3/MM3 (ref 0–0.4)
EOSINOPHIL NFR BLD AUTO: 2.6 % (ref 0.3–6.2)
ERYTHROCYTE [DISTWIDTH] IN BLOOD BY AUTOMATED COUNT: 13.9 % (ref 12.3–15.4)
GLOBULIN UR ELPH-MCNC: 2.6 GM/DL
GLUCOSE SERPL-MCNC: 121 MG/DL (ref 65–99)
HBA1C MFR BLD: 6.9 % (ref 4.8–5.6)
HCT VFR BLD AUTO: 46.1 % (ref 37.5–51)
HDLC SERPL-MCNC: 42 MG/DL (ref 40–60)
HGB BLD-MCNC: 15.3 G/DL (ref 13–17.7)
IMM GRANULOCYTES # BLD AUTO: 0.04 10*3/MM3 (ref 0–0.05)
IMM GRANULOCYTES NFR BLD AUTO: 0.5 % (ref 0–0.5)
LDLC SERPL CALC-MCNC: 72 MG/DL (ref 0–100)
LDLC/HDLC SERPL: 1.56 {RATIO}
LYMPHOCYTES # BLD AUTO: 2.73 10*3/MM3 (ref 0.7–3.1)
LYMPHOCYTES NFR BLD AUTO: 37 % (ref 19.6–45.3)
MCH RBC QN AUTO: 29.8 PG (ref 26.6–33)
MCHC RBC AUTO-ENTMCNC: 33.2 G/DL (ref 31.5–35.7)
MCV RBC AUTO: 89.9 FL (ref 79–97)
MONOCYTES # BLD AUTO: 0.59 10*3/MM3 (ref 0.1–0.9)
MONOCYTES NFR BLD AUTO: 8 % (ref 5–12)
NEUTROPHILS NFR BLD AUTO: 3.8 10*3/MM3 (ref 1.7–7)
NEUTROPHILS NFR BLD AUTO: 51.5 % (ref 42.7–76)
NRBC BLD AUTO-RTO: 0 /100 WBC (ref 0–0.2)
PLATELET # BLD AUTO: 139 10*3/MM3 (ref 140–450)
PMV BLD AUTO: 12.5 FL (ref 6–12)
POTASSIUM SERPL-SCNC: 4.4 MMOL/L (ref 3.5–5.2)
PROT SERPL-MCNC: 6.8 G/DL (ref 6–8.5)
PSA SERPL-MCNC: 0.63 NG/ML (ref 0–4)
RBC # BLD AUTO: 5.13 10*6/MM3 (ref 4.14–5.8)
SODIUM SERPL-SCNC: 139 MMOL/L (ref 136–145)
TRIGL SERPL-MCNC: 187 MG/DL (ref 0–150)
VLDLC SERPL-MCNC: 31 MG/DL (ref 5–40)
WBC NRBC COR # BLD: 7.38 10*3/MM3 (ref 3.4–10.8)

## 2023-10-20 PROCEDURE — G0103 PSA SCREENING: HCPCS | Performed by: FAMILY MEDICINE

## 2023-10-20 PROCEDURE — 80061 LIPID PANEL: CPT | Performed by: FAMILY MEDICINE

## 2023-10-20 PROCEDURE — 80053 COMPREHEN METABOLIC PANEL: CPT | Performed by: FAMILY MEDICINE

## 2023-10-20 PROCEDURE — 85025 COMPLETE CBC W/AUTO DIFF WBC: CPT | Performed by: FAMILY MEDICINE

## 2023-10-20 PROCEDURE — 83036 HEMOGLOBIN GLYCOSYLATED A1C: CPT | Performed by: FAMILY MEDICINE

## 2023-10-20 NOTE — PROGRESS NOTES
..  Venipuncture Blood Specimen Collection  Venipuncture performed in left arm by Magda Romero with good hemostasis. Patient tolerated the procedure well without complications.   10/20/23   Magda Romero    89-year-old female past medical history of of A. fib not on AC hyperlipidemia dementia here for generalized weakness cough    covid  weakness  OP  OA  AF  Cough  HLD  Dementia    concern for Dysphagia - am events noted  SLP eval -   vs noted  labs reviewed  ID eval noted    on remdesivir for covid  cxr noted  labs reviewed  dvt p  assist with needs  isolation precs  oral hygiene  acap prn  robitussin prn  ID eval  cvs rx regimen and BP control - hx of AF - not on AC

## 2023-10-20 NOTE — PROGRESS NOTES
"Chief Complaint    Annual Exam, Prediabetes (Follow-up, labs), Hyperlipidemia, and Hypertension    Subjective      Babak Manzano presents to [facility]    History of Present Illness    1.) DYSLIPIDEMIA/PREDIABETES/MORBID OBESITY : Most recent hemoglobin A1c measured at 6.20%.  Most recent cholesterol panel significant for an LDL of 104.  Patient currently does not take any medications.  Regarding diagnosis of morbid obesity, patient reports that he has made some adjustments to his diet.  However, he does admit in the room that he continues to ingest certain food that are not particularly healthy.     2.) RIGHT LOWER EXTREMITY WEAKNESS/RIGHT HIP PAIN : Onset - unclear.  Patient reports experiencing right inguinal pain along with lower ext weakness.  He reports experiencing sxs when attempting to perform certain activities such as entering his truck.  Quality of right inguinal pain unclear.  No reported remitting factors.  No complaint of new-onset low back pain.     Objective     Vital Signs:     /72 (BP Location: Left arm, Patient Position: Sitting, Cuff Size: Adult)   Pulse 102   Temp 97.3 °F (36.3 °C) (Temporal)   Ht 182.9 cm (72\")   Wt (!) 152 kg (335 lb)   SpO2 98%   BMI 45.43 kg/m²       Physical Exam  Vitals reviewed.   Constitutional:       General: He is not in acute distress.     Appearance: He is well-developed.      Comments: (+) morbid obesity    HENT:      Head: Normocephalic and atraumatic.      Right Ear: Hearing and external ear normal.      Left Ear: Hearing and external ear normal.      Nose: Nose normal.   Eyes:      General: Lids are normal.         Right eye: No discharge.         Left eye: No discharge.      Conjunctiva/sclera: Conjunctivae normal.   Cardiovascular:      Rate and Rhythm: Normal rate and regular rhythm.   Pulmonary:      Effort: Pulmonary effort is normal.      Breath sounds: Normal breath sounds.   Abdominal:      General: There is " no distension.   Musculoskeletal:         General: No swelling.      Cervical back: Neck supple.   Skin:     Coloration: Skin is not jaundiced.      Findings: No erythema.   Neurological:      Mental Status: He is alert. Mental status is at baseline.   Psychiatric:         Mood and Affect: Mood and affect normal.         Thought Content: Thought content normal.       Class 3 Severe Obesity (BMI >=40). Obesity-related health conditions include the following: dyslipidemias. Obesity is unchanged. BMI is is above average; BMI management plan is completed. We discussed portion control and increasing exercise. Pt admits to a poor diet. He admits to removing some      Assessment and Plan     Diagnoses and all orders for this visit:    1. Hyperlipidemia, unspecified hyperlipidemia type (Primary)  Comments:  Repeat labs as noted. Additional recs per review.  Orders:  -     Comprehensive Metabolic Panel  -     Lipid Panel    2. Prediabetes  Comments:  Same as above.  Orders:  -     Hemoglobin A1c    3. History of thrombocytopenia  -     CBC & Differential    4. Screening for prostate cancer  -     PSA SCREENING    5. Class 3 severe obesity due to excess calories without serious comorbidity with body mass index (BMI) of 45.0 to 49.9 in adult  Comments:  Advised regarding importance of a healthy diet. Will continue to .    6. Need for influenza vaccination  Comments:  Refused per patient.    7. Right hip pain  Comments:  Imaging here in ofc. Will await radiology report. Per rad report, will likely suggest PT.  Orders:  -     XR Hip With or Without Pelvis 2 - 3 View Right    8. Weakness of right leg  -     XR Hip With or Without Pelvis 2 - 3 View Right      Follow Up     Return TBD per review of labs.    Patient was given instructions and counseling regarding his condition or for health maintenance advice. Please see specific information pulled into the AVS if appropriate.

## 2024-10-02 ENCOUNTER — TELEPHONE (OUTPATIENT)
Dept: FAMILY MEDICINE CLINIC | Facility: CLINIC | Age: 54
End: 2024-10-02
Payer: COMMERCIAL

## 2024-10-02 NOTE — TELEPHONE ENCOUNTER
Caller: Babak Manzano    Relationship: Self    Best call back number: [phone]      Who is your current provider: MARCI BETANCOURT     Is your current provider offboarding? NO     Who would you like your new provider to be: MARICRUZ TEJADA     What are your reasons for transferring care:      PREFER A MAIL PROVIDER AND SPOUSE IS A PATIENT OF DR TEJADA      Additional notes:           PLEASE CALL PATIENT AND ADVISE

## 2024-11-05 ENCOUNTER — OFFICE VISIT (OUTPATIENT)
Dept: FAMILY MEDICINE CLINIC | Facility: CLINIC | Age: 54
End: 2024-11-05
Payer: COMMERCIAL

## 2024-11-05 VITALS
TEMPERATURE: 97.5 F | SYSTOLIC BLOOD PRESSURE: 125 MMHG | BODY MASS INDEX: 44.1 KG/M2 | OXYGEN SATURATION: 99 % | HEART RATE: 80 BPM | HEIGHT: 71 IN | WEIGHT: 315 LBS | DIASTOLIC BLOOD PRESSURE: 80 MMHG

## 2024-11-05 DIAGNOSIS — N52.9 ERECTILE DYSFUNCTION, UNSPECIFIED ERECTILE DYSFUNCTION TYPE: ICD-10-CM

## 2024-11-05 DIAGNOSIS — Z12.2 ENCOUNTER FOR SCREENING FOR LUNG CANCER: ICD-10-CM

## 2024-11-05 DIAGNOSIS — B35.1 ONYCHOMYCOSIS: ICD-10-CM

## 2024-11-05 DIAGNOSIS — Z00.00 ANNUAL PHYSICAL EXAM: Primary | ICD-10-CM

## 2024-11-05 DIAGNOSIS — Z12.5 PROSTATE CANCER SCREENING: ICD-10-CM

## 2024-11-05 DIAGNOSIS — E11.65 TYPE 2 DIABETES MELLITUS WITH HYPERGLYCEMIA, WITHOUT LONG-TERM CURRENT USE OF INSULIN: ICD-10-CM

## 2024-11-05 DIAGNOSIS — E11.9 ENCOUNTER FOR DIABETIC FOOT EXAM: ICD-10-CM

## 2024-11-05 DIAGNOSIS — L98.9 SCALP LESION: ICD-10-CM

## 2024-11-05 LAB
ALBUMIN SERPL-MCNC: 3.9 G/DL (ref 3.5–5.2)
ALBUMIN UR-MCNC: <1.2 MG/DL
ALBUMIN/GLOB SERPL: 1.2 G/DL
ALP SERPL-CCNC: 58 U/L (ref 39–117)
ALT SERPL W P-5'-P-CCNC: 57 U/L (ref 1–41)
ANION GAP SERPL CALCULATED.3IONS-SCNC: 8.7 MMOL/L (ref 5–15)
AST SERPL-CCNC: 36 U/L (ref 1–40)
BASOPHILS # BLD AUTO: 0.05 10*3/MM3 (ref 0–0.2)
BASOPHILS NFR BLD AUTO: 0.6 % (ref 0–1.5)
BILIRUB SERPL-MCNC: 0.3 MG/DL (ref 0–1.2)
BUN SERPL-MCNC: 19 MG/DL (ref 6–20)
BUN/CREAT SERPL: 16.5 (ref 7–25)
CALCIUM SPEC-SCNC: 9.9 MG/DL (ref 8.6–10.5)
CHLORIDE SERPL-SCNC: 102 MMOL/L (ref 98–107)
CHOLEST SERPL-MCNC: 177 MG/DL (ref 0–200)
CO2 SERPL-SCNC: 28.3 MMOL/L (ref 22–29)
CREAT SERPL-MCNC: 1.15 MG/DL (ref 0.76–1.27)
CREAT UR-MCNC: 149.6 MG/DL
DEPRECATED RDW RBC AUTO: 47.4 FL (ref 37–54)
EGFRCR SERPLBLD CKD-EPI 2021: 76.1 ML/MIN/1.73
EOSINOPHIL # BLD AUTO: 0.21 10*3/MM3 (ref 0–0.4)
EOSINOPHIL NFR BLD AUTO: 2.4 % (ref 0.3–6.2)
ERYTHROCYTE [DISTWIDTH] IN BLOOD BY AUTOMATED COUNT: 13.8 % (ref 12.3–15.4)
GLOBULIN UR ELPH-MCNC: 3.2 GM/DL
GLUCOSE SERPL-MCNC: 157 MG/DL (ref 65–99)
HBA1C MFR BLD: 6.5 % (ref 4.8–5.6)
HCT VFR BLD AUTO: 48.1 % (ref 37.5–51)
HDLC SERPL-MCNC: 45 MG/DL (ref 40–60)
HGB BLD-MCNC: 15.4 G/DL (ref 13–17.7)
IMM GRANULOCYTES # BLD AUTO: 0.04 10*3/MM3 (ref 0–0.05)
IMM GRANULOCYTES NFR BLD AUTO: 0.5 % (ref 0–0.5)
LDLC SERPL CALC-MCNC: 94 MG/DL (ref 0–100)
LDLC/HDLC SERPL: 1.92 {RATIO}
LYMPHOCYTES # BLD AUTO: 3.2 10*3/MM3 (ref 0.7–3.1)
LYMPHOCYTES NFR BLD AUTO: 36.9 % (ref 19.6–45.3)
MCH RBC QN AUTO: 29.8 PG (ref 26.6–33)
MCHC RBC AUTO-ENTMCNC: 32 G/DL (ref 31.5–35.7)
MCV RBC AUTO: 93.2 FL (ref 79–97)
MICROALBUMIN/CREAT UR: NORMAL MG/G{CREAT}
MONOCYTES # BLD AUTO: 0.55 10*3/MM3 (ref 0.1–0.9)
MONOCYTES NFR BLD AUTO: 6.3 % (ref 5–12)
NEUTROPHILS NFR BLD AUTO: 4.63 10*3/MM3 (ref 1.7–7)
NEUTROPHILS NFR BLD AUTO: 53.3 % (ref 42.7–76)
PLATELET # BLD AUTO: 137 10*3/MM3 (ref 140–450)
PMV BLD AUTO: 13.8 FL (ref 6–12)
POTASSIUM SERPL-SCNC: 4.3 MMOL/L (ref 3.5–5.2)
PROT SERPL-MCNC: 7.1 G/DL (ref 6–8.5)
PSA SERPL-MCNC: 0.69 NG/ML (ref 0–4)
RBC # BLD AUTO: 5.16 10*6/MM3 (ref 4.14–5.8)
SODIUM SERPL-SCNC: 139 MMOL/L (ref 136–145)
TRIGL SERPL-MCNC: 227 MG/DL (ref 0–150)
TSH SERPL DL<=0.05 MIU/L-ACNC: 2.51 UIU/ML (ref 0.27–4.2)
VLDLC SERPL-MCNC: 38 MG/DL (ref 5–40)
WBC NRBC COR # BLD AUTO: 8.68 10*3/MM3 (ref 3.4–10.8)

## 2024-11-05 PROCEDURE — 99396 PREV VISIT EST AGE 40-64: CPT | Performed by: FAMILY MEDICINE

## 2024-11-05 PROCEDURE — 82043 UR ALBUMIN QUANTITATIVE: CPT | Performed by: FAMILY MEDICINE

## 2024-11-05 PROCEDURE — 82570 ASSAY OF URINE CREATININE: CPT | Performed by: FAMILY MEDICINE

## 2024-11-05 PROCEDURE — 83036 HEMOGLOBIN GLYCOSYLATED A1C: CPT | Performed by: FAMILY MEDICINE

## 2024-11-05 PROCEDURE — 36415 COLL VENOUS BLD VENIPUNCTURE: CPT | Performed by: FAMILY MEDICINE

## 2024-11-05 PROCEDURE — 80050 GENERAL HEALTH PANEL: CPT | Performed by: FAMILY MEDICINE

## 2024-11-05 PROCEDURE — G0103 PSA SCREENING: HCPCS | Performed by: FAMILY MEDICINE

## 2024-11-05 PROCEDURE — 80061 LIPID PANEL: CPT | Performed by: FAMILY MEDICINE

## 2024-11-05 NOTE — PROGRESS NOTES
"Chief Complaint  Annual Exam (Annual exam )    Subjective      Babak Manzano is a 54 y.o. male who presents to [facility]     Here for annual exam.    Due for annual labs.     DM2. Not on any medications for this. Due for A1c and labs. Sees an eye doctor later this year. Says he has an allergy to a binding agent used in a lot of medications - struggles with a ton of even OTC medications due to this.    Skin lesion on his scalp. Looks like an actinic keratosis.    Having \"severe erectile dysfunction\", low libido. Says a few minutes into sexual activity, he's done. He's tried Rugiet (which is a combination of sildenafil and taladafil) which only helps for a little bit. He wants to see a Urologist.    Objective   Vital Signs:   Vitals:    11/05/24 0913   BP: 125/80   Pulse: 80   Temp: 97.5 °F (36.4 °C)   SpO2: 99%   Weight: (!) 145 kg (320 lb 8 oz)   Height: 181 cm (71.25\")     Body mass index is 44.39 kg/m².    Wt Readings from Last 3 Encounters:   11/05/24 (!) 145 kg (320 lb 8 oz)   10/20/23 (!) 152 kg (335 lb)   02/24/23 (!) 138 kg (304 lb)     BP Readings from Last 3 Encounters:   11/05/24 125/80   10/20/23 134/72   02/23/23 122/82       Health Maintenance   Topic Date Due    URINE MICROALBUMIN  Never done    Pneumococcal Vaccine 0-64 (1 of 2 - PCV) Never done    DIABETIC EYE EXAM  Never done    Hepatitis B (1 of 3 - 19+ 3-dose series) Never done    LUNG CANCER SCREENING  11/08/2023    HEMOGLOBIN A1C  04/20/2024    LIPID PANEL  10/20/2024    ZOSTER VACCINE (1 of 2) 11/05/2024 (Originally 8/16/2021)    COVID-19 Vaccine (1 - 2024-25 season) 11/07/2024 (Originally 9/1/2024)    INFLUENZA VACCINE  03/31/2025 (Originally 8/1/2024)    ANNUAL PHYSICAL  11/05/2025    DIABETIC FOOT EXAM  11/05/2025    BMI FOLLOWUP  11/05/2025    TDAP/TD VACCINES (2 - Td or Tdap) 08/30/2027    COLORECTAL CANCER SCREENING  02/23/2033    HEPATITIS C SCREENING  Completed       Physical Exam  Vitals and nursing " note reviewed.   Constitutional:       General: He is not in acute distress.     Appearance: He is obese.   Cardiovascular:      Rate and Rhythm: Normal rate and regular rhythm.      Heart sounds: Normal heart sounds.   Pulmonary:      Effort: Pulmonary effort is normal. No respiratory distress.      Breath sounds: Normal breath sounds.   Musculoskeletal:      Right foot: No deformity.      Left foot: No deformity.   Feet:      Right foot:      Protective Sensation: 10 sites tested.  10 sites sensed.      Skin integrity: Skin integrity normal. Callus and dry skin present.      Toenail Condition: Right toenails are abnormally thick. Fungal disease present.     Left foot:      Protective Sensation: 10 sites tested.  10 sites sensed.      Skin integrity: Skin integrity normal. Callus and dry skin present.      Toenail Condition: Left toenails are abnormally thick. Fungal disease present.  Skin:     Comments: Small slightly rough skin lesion on the top of his scalp.  Looks like potential actinic keratosis.   Neurological:      Mental Status: He is alert.   Psychiatric:         Mood and Affect: Mood normal.         Behavior: Behavior normal.          Result Review :  The following data was reviewed by: Rolf Miller MD on 11/05/2024:         Procedures          Assessment & Plan  Annual physical exam  Annual labs ordered today, discussed and recommended weight loss    Orders:    CBC & Differential    Comprehensive Metabolic Panel    TSH Rfx On Abnormal To Free T4    Prostate cancer screening    Orders:    PSA Screen    Type 2 diabetes mellitus with hyperglycemia, without long-term current use of insulin  Recheck A1c today.  Also checking microalbumin.  Recommended weight loss.  He follows an eye doctor and sees them later this year.    Orders:    Hemoglobin A1c    Lipid Panel    Microalbumin / Creatinine Urine Ratio - Urine, Clean Catch    Encounter for diabetic foot exam  Diabetic foot exam performed today.        Encounter for screening for lung cancer    Orders:     CT Chest Low Dose Cancer Screening WO; Future    Onychomycosis  Referred to podiatry due to severe onychomycosis.  Orders:    Ambulatory Referral to Podiatry    Scalp lesion  Refer to dermatology for removal and treatment  Orders:    Ambulatory Referral to Dermatology    Erectile dysfunction, unspecified erectile dysfunction type  Has erectile dysfunction with minimal improvement with sildenafil/tadalafil.  He was interested in referral to urology so I placed this order today.  Orders:    Ambulatory Referral to Urology             FOLLOW UP  Return in about 1 year (around 11/5/2025) for Annual physical.  Patient was given instructions and counseling regarding his condition or for health maintenance advice. Please see specific information pulled into the AVS if appropriate.       Rolf Miller MD  11/05/24  09:40 EST    CURRENT & DISCONTINUED MEDICATIONS  Current Outpatient Medications   Medication Instructions    ibuprofen (ADVIL,MOTRIN) 200 mg, Every 6 Hours PRN       There are no discontinued medications.

## 2024-11-05 NOTE — PROGRESS NOTES
Venipuncture Blood Specimen Collection  Venipuncture performed in left arm  by Ynes Somers with good hemostasis. Patient tolerated the procedure well without complications.   11/05/24   Ynes Somers

## 2025-01-10 ENCOUNTER — OFFICE VISIT (OUTPATIENT)
Dept: PODIATRY | Facility: CLINIC | Age: 55
End: 2025-01-10
Payer: COMMERCIAL

## 2025-01-10 VITALS
TEMPERATURE: 98 F | WEIGHT: 315 LBS | SYSTOLIC BLOOD PRESSURE: 145 MMHG | OXYGEN SATURATION: 95 % | DIASTOLIC BLOOD PRESSURE: 91 MMHG | BODY MASS INDEX: 44.04 KG/M2 | HEART RATE: 86 BPM

## 2025-01-10 DIAGNOSIS — L30.8 OTHER ECZEMA: ICD-10-CM

## 2025-01-10 DIAGNOSIS — B35.1 ONYCHOMYCOSIS: Primary | ICD-10-CM

## 2025-01-10 RX ORDER — TERBINAFINE HYDROCHLORIDE 250 MG/1
250 TABLET ORAL DAILY
Qty: 90 TABLET | Refills: 0 | Status: SHIPPED | OUTPATIENT
Start: 2025-01-10 | End: 2025-04-10

## 2025-01-10 RX ORDER — HYDROCORTISONE 25 MG/G
1 CREAM TOPICAL DAILY
Status: SHIPPED | OUTPATIENT
Start: 2025-01-10

## 2025-01-10 NOTE — PROGRESS NOTES
[facility] - PODIATRY    Today's Date: 01/10/25    Patient Name: Babak Manzano  MRN: [id number]  CSN: [id number]  PCP: Rolf Miller MD,   Referring Provider: Rolf Miller MD    SUBJECTIVE     Chief Complaint   Patient presents with    Left Foot - Nail Problem, Establish Care     Referral from Dr Miller for Onychomycosis    Right Foot - Establish Care, Nail Problem     HPI: Babak Manzano, a 54 y.o.male, presents to clinic.    Patient is a 54-year-old male presenting with elongated dystrophic painful toenails.  Patient states has been going on for years.  Patient states it started when he was a child.  Patient also has chronically dry feet as well.  Patient would like to try terbinafine for the toenails as it has helped his mother.  Past Medical History:   Diagnosis Date    Hyperlipidemia     Prediabetes      Past Surgical History:   Procedure Laterality Date    BACK SURGERY      CERVICAL SPINE POSTERIOR      COLONOSCOPY N/A 2023    Procedure: COLONOSCOPY;  Surgeon: Maurisio Mendoza MD;  Location: [facility] ENDOSCOPY;  Service: General;  Laterality: N/A;  normal colon    CYST REMOVAL Right     upper back    FRACTURE SURGERY      HERNIA REPAIR       History reviewed. No pertinent family history.  Social History     Socioeconomic History    Marital status:    Tobacco Use    Smoking status: Former     Current packs/day: 0.00     Average packs/day: 0.5 packs/day for 43.9 years (21.9 ttl pk-yrs)     Types: Cigarettes     Start date:      Quit date: 2022     Years since quittin.1    Smokeless tobacco: Never   Vaping Use    Vaping status: Some Days    Substances: Nicotine   Substance and Sexual Activity    Alcohol use: Yes     Comment: social    Drug use: Never    Sexual activity: Defer     Allergies   Allergen Reactions    Azithromycin Hives    Dilaudid [Hydromorphone] Hives    Norco [Hydrocodone-Acetaminophen] Hives    Penicillins Hives    Percocet  [Oxycodone-Acetaminophen] Hives    Sulfa Antibiotics Hives and Swelling    Tylenol [Acetaminophen] Hives     Current Outpatient Medications   Medication Sig Dispense Refill    ibuprofen (ADVIL,MOTRIN) 200 MG tablet Take 1 tablet by mouth Every 6 (Six) Hours As Needed.      terbinafine (lamiSIL) 250 MG tablet Take 1 tablet by mouth Daily for 90 days. 90 tablet 0     Current Facility-Administered Medications   Medication Dose Route Frequency Provider Last Rate Last Admin    hydrocortisone 2.5 % cream 1 Application  1 Application Topical Daily Gm Adams DPM         Review of Systems   Constitutional: Negative.    Skin:         Painful toenails.   All other systems reviewed and are negative.      OBJECTIVE     Vitals:    01/10/25 0853   BP: 145/91   Pulse: 86   Temp: 98 °F (36.7 °C)   SpO2: 95%       WBC   Date Value Ref Range Status   11/05/2024 8.68 3.40 - 10.80 10*3/mm3 Final     RBC   Date Value Ref Range Status   11/05/2024 5.16 4.14 - 5.80 10*6/mm3 Final     Hemoglobin   Date Value Ref Range Status   11/05/2024 15.4 13.0 - 17.7 g/dL Final     Hematocrit   Date Value Ref Range Status   11/05/2024 48.1 37.5 - 51.0 % Final     MCV   Date Value Ref Range Status   11/05/2024 93.2 79.0 - 97.0 fL Final     MCH   Date Value Ref Range Status   11/05/2024 29.8 26.6 - 33.0 pg Final     MCHC   Date Value Ref Range Status   11/05/2024 32.0 31.5 - 35.7 g/dL Final     RDW   Date Value Ref Range Status   11/05/2024 13.8 12.3 - 15.4 % Final     RDW-SD   Date Value Ref Range Status   11/05/2024 47.4 37.0 - 54.0 fl Final     MPV   Date Value Ref Range Status   11/05/2024 13.8 (H) 6.0 - 12.0 fL Final     Platelets   Date Value Ref Range Status   11/05/2024 137 (L) 140 - 450 10*3/mm3 Final     Neutrophil %   Date Value Ref Range Status   11/05/2024 53.3 42.7 - 76.0 % Final     Lymphocyte %   Date Value Ref Range Status   11/05/2024 36.9 19.6 - 45.3 % Final     Monocyte %   Date Value Ref Range Status   11/05/2024 6.3 5.0 -  12.0 % Final     Eosinophil %   Date Value Ref Range Status   11/05/2024 2.4 0.3 - 6.2 % Final     Basophil %   Date Value Ref Range Status   11/05/2024 0.6 0.0 - 1.5 % Final     Immature Grans %   Date Value Ref Range Status   11/05/2024 0.5 0.0 - 0.5 % Final     Neutrophils, Absolute   Date Value Ref Range Status   11/05/2024 4.63 1.70 - 7.00 10*3/mm3 Final     Lymphocytes, Absolute   Date Value Ref Range Status   11/05/2024 3.20 (H) 0.70 - 3.10 10*3/mm3 Final     Monocytes, Absolute   Date Value Ref Range Status   11/05/2024 0.55 0.10 - 0.90 10*3/mm3 Final     Eosinophils, Absolute   Date Value Ref Range Status   11/05/2024 0.21 0.00 - 0.40 10*3/mm3 Final     Basophils, Absolute   Date Value Ref Range Status   11/05/2024 0.05 0.00 - 0.20 10*3/mm3 Final     Immature Grans, Absolute   Date Value Ref Range Status   11/05/2024 0.04 0.00 - 0.05 10*3/mm3 Final     nRBC   Date Value Ref Range Status   10/20/2023 0.0 0.0 - 0.2 /100 WBC Final         Lab Results   Component Value Date    GLUCOSE 157 (H) 11/05/2024    BUN 19 11/05/2024    CREATININE 1.15 11/05/2024    BCR 16.5 11/05/2024    K 4.3 11/05/2024    CO2 28.3 11/05/2024    CALCIUM 9.9 11/05/2024    ALBUMIN 3.9 11/05/2024    AST 36 11/05/2024    ALT 57 (H) 11/05/2024       Patient seen in no apparent distress.      PHYSICAL EXAM:     Foot/Ankle Exam    GENERAL  Appearance:  appears stated age  Orientation:  AAOx3  Affect:  appropriate  Gait:  unimpaired  Assistance:  independent  Right shoe gear: casual shoe  Left shoe gear: casual shoe    VASCULAR     Right Foot Vascularity   Normal vascular exam    Dorsalis pedis:  2+  Posterior tibial:  2+  Skin temperature:  warm  Edema grading:  None  CFT:  < 3 seconds  Pedal hair growth:  Present  Varicosities:  none     Left Foot Vascularity   Normal vascular exam    Dorsalis pedis:  2+  Posterior tibial:  2+  Skin temperature:  warm  Edema grading:  None  CFT:  < 3 seconds  Pedal hair growth:  Present  Varicosities:   none     NEUROLOGIC     Right Foot Neurologic   Normal sensation    Light touch sensation: normal  Vibratory sensation: normal  Hot/Cold sensation: normal  Protective Sensation using [place] Monofilament:   Sites intact: 10  Sites tested: 10     Left Foot Neurologic   Normal sensation    Light touch sensation: normal  Vibratory sensation: normal  Hot/Cold sensation:  normal  Protective Sensation using [place]ep Monofilament:   Sites intact: 10  Sites tested: 10    MUSCLE STRENGTH     Right Foot Muscle Strength   Foot dorsiflexion:  4  Foot plantar flexion:  4  Foot inversion:  4  Foot eversion:  4     Left Foot Muscle Strength   Foot dorsiflexion:  4  Foot plantar flexion:  4  Foot inversion:  4  Foot eversion:  4    RANGE OF MOTION     Right Foot Range of Motion   Foot and ankle ROM within normal limits       Left Foot Range of Motion   Foot and ankle ROM within normal limits      DERMATOLOGIC      Right Foot Dermatologic   Skin  Right foot skin is intact.   Nails  1.  Positive for elongated, onychomycosis, abnormal thickness, subungual debris and ingrown toenail.  2.  Positive for elongated, onychomycosis, abnormal thickness, subungual debris and ingrown toenail.  3.  Positive for elongated, onychomycosis, abnormal thickness, subungual debris and ingrown toenail.  4.  Positive for elongated, onychomycosis, abnormal thickness, subungual debris and ingrown toenail.  5.  Positive for elongated, onychomycosis, abnormal thickness, subungual debris and ingrown toenail.  Nails comment:  Toenails 1, 2, 3, 4, and 5     Left Foot Dermatologic   Skin  Left foot skin is intact.   Nails comment:  Toenails 1, 2, 3, 4, and 5  Nails  1.  Positive for elongated, onychomycosis, abnormal thickness, subungual debris and ingrown toenail.  2.  Positive for elongated, onychomycosis, abnormal thickness, subungual debris and ingrown toenail.  3.  Positive for elongated, onychomycosis, abnormal thickness, subungual debris  and ingrown toenail.  4.  Positive for elongated, onychomycosis, abnormally thick, subungual debris and ingrown toenail.  5.  Positive for elongated, onychomycosis, abnormally thick, subungual debris and ingrown toenail.     Right foot additional comments: Dry feet to plantar feet     Left foot additional comments: Dry feet to plantar feet      RADIOLOGY:        No results found.    ASSESSMENT/PLAN     Diagnoses and all orders for this visit:    1. Onychomycosis (Primary)  -     Hepatic Function Panel; Future  -     hydrocortisone 2.5 % cream 1 Application    2. Other eczema  -     hydrocortisone 2.5 % cream 1 Application    Other orders  -     terbinafine (lamiSIL) 250 MG tablet; Take 1 tablet by mouth Daily for 90 days.  Dispense: 90 tablet; Refill: 0        Comprehensive lower extremity examination and evaluation was performed.    Terbinafine and prescribed.  Risk and benefits discussed with patient.  Patient to get liver function tests in 30 to 45 days.  Patient to follow-up in 90 days.    Discussed findings and treatment plan including risks, benefits, and treatment options with patient in detail. Patient agreed with treatment plan.    Medications and allergies reviewed.  Reviewed available lab values along with other pertinent labs.  These were discussed with the patient.    An After Visit Summary was printed and given to the patient at discharge, including (if requested) any available informative/educational handouts regarding diagnosis, treatment, or medications. All questions were answered to patient/family satisfaction. Should symptoms fail to improve or worsen they agree to call or return to clinic or to go to the Emergency Department. Discussed the importance of following up with any needed screening tests/labs/specialist appointments and any requested follow-up recommended by me today. Importance of maintaining follow-up discussed and patient accepts that missed appointments can delay diagnosis and  potentially lead to worsening of conditions.    Return in about 3 months (around 4/10/2025)., or sooner if acute issues arise.    This document has been electronically signed by Gm Adams DPM on January 10, 2025 10:45 EST

## 2025-01-14 NOTE — PROGRESS NOTES
Chief Complaint: Erectile Dysfunction    Subjective         History of Present Illness  Babak Manzano is a 54 y.o. male presents to [facility] to be seen for ED.    Patient presents reporting he has been having issues with erectile dysfunction for the past several years. He is not even getting semi erect at this point. He is on Rugient which is a combo of tadalafil, sildenafil and levitra. He reports this works but does not sustain.           History of stones-admits- years ago     surgeries-denies    Family history of  malignancy-denies    Cardiopulmonary-denies    Anticoagulants-denies    Smoker-occasionally, also vapes occasionally    PSA  11/5/2024 0.685  10/20/2023 0.633        Objective     Past Medical History:   Diagnosis Date    Hyperlipidemia     Prediabetes        Past Surgical History:   Procedure Laterality Date    BACK SURGERY      CERVICAL SPINE POSTERIOR      COLONOSCOPY N/A 2/23/2023    Procedure: COLONOSCOPY;  Surgeon: Maurisio Mendoza MD;  Location: [facility] ENDOSCOPY;  Service: General;  Laterality: N/A;  normal colon    CYST REMOVAL Right     upper back    FRACTURE SURGERY      HERNIA REPAIR           Current Outpatient Medications:     ibuprofen (ADVIL,MOTRIN) 200 MG tablet, Take 1 tablet by mouth Every 6 (Six) Hours As Needed., Disp: , Rfl:     terbinafine (lamiSIL) 250 MG tablet, Take 1 tablet by mouth Daily for 90 days., Disp: 90 tablet, Rfl: 0    Current Facility-Administered Medications:     hydrocortisone 2.5 % cream 1 Application, 1 Application, Topical, Daily, Gm Adams, DPM    Allergies   Allergen Reactions    Azithromycin Hives    Dilaudid [Hydromorphone] Hives    Norco [Hydrocodone-Acetaminophen] Hives    Penicillins Hives    Percocet [Oxycodone-Acetaminophen] Hives    Sulfa Antibiotics Hives and Swelling    Tylenol [Acetaminophen] Hives        No family history on file.    Social History     Socioeconomic History    Marital status:   "  Tobacco Use    Smoking status: Former     Current packs/day: 0.00     Average packs/day: 0.5 packs/day for 43.9 years (21.9 ttl pk-yrs)     Types: Cigarettes     Start date:      Quit date: 2022     Years since quittin.1     Passive exposure: Past    Smokeless tobacco: Never   Vaping Use    Vaping status: Some Days    Substances: Nicotine   Substance and Sexual Activity    Alcohol use: Yes     Comment: social    Drug use: Never    Sexual activity: Defer       Vital Signs:   Resp 14   Ht 181 cm (71.26\")   Wt (!) 144 kg (317 lb 7.4 oz)   BMI 43.95 kg/m²      Physical Exam  Vitals reviewed.   Constitutional:       Appearance: Normal appearance.   Neurological:      General: No focal deficit present.      Mental Status: He is alert and oriented to person, place, and time.   Psychiatric:         Mood and Affect: Mood normal.         Behavior: Behavior normal.          Result Review :   The following data was reviewed by: SANTHOSH Mclain on 01/15/2025:     PSA          2024    10:25   PSA   PSA 0.685          Procedures        Assessment and Plan    Diagnoses and all orders for this visit:    1. Other male erectile dysfunction (Primary)    Given that the medication he is currently using is somewhat effective, he will continue using this for now.  We did discuss the possibility of Trimix injections but he wants to hold off and discuss this further with his wife before making a decision on this.  I did advise him if he decides that he wants to go the injection route that he would need to let me know so that I can order the sample vial to come here to the office.  He does understand that we would schedule him for a teaching appointment to do this.    We also discussed the possibility of implant but he is not interested in that at this time.    I will see him back on an as-needed basis.    Follow Up   No follow-ups on file.  Patient was given instructions and counseling regarding his " condition or for health maintenance advice. Please see specific information pulled into the AVS if appropriate.         This document has been electronically signed by SANTHOSH Mclain  January 15, 2025 14:28 EST

## 2025-01-15 ENCOUNTER — OFFICE VISIT (OUTPATIENT)
Dept: UROLOGY | Age: 55
End: 2025-01-15
Payer: COMMERCIAL

## 2025-01-15 VITALS — RESPIRATION RATE: 14 BRPM | HEIGHT: 71 IN | WEIGHT: 315 LBS | BODY MASS INDEX: 44.1 KG/M2

## 2025-01-15 DIAGNOSIS — N52.8 OTHER MALE ERECTILE DYSFUNCTION: Primary | ICD-10-CM

## (undated) DEVICE — STERILE POLYISOPRENE POWDER-FREE SURGICAL GLOVES WITH EMOLLIENT COATING: Brand: PROTEXIS

## (undated) DEVICE — CONN JET HYDRA H20 AUXILIARY DISP

## (undated) DEVICE — SOL IRRG H2O PL/BG 1000ML STRL

## (undated) DEVICE — Device

## (undated) DEVICE — LINER SURG CANSTR SXN S/RIGD 1500CC

## (undated) DEVICE — STERILE POLYISOPRENE POWDER-FREE SURGICAL GLOVES: Brand: PROTEXIS

## (undated) DEVICE — SOL IRR H2O BTL 1000ML STRL

## (undated) DEVICE — SOLIDIFIER LIQLOC PLS 1500CC BT

## (undated) DEVICE — Device: Brand: DEFENDO AIR/WATER/SUCTION AND BIOPSY VALVE

## (undated) DEVICE — TUBING, SUCTION, 1/4" X 10', STRAIGHT: Brand: MEDLINE